# Patient Record
Sex: MALE | Race: WHITE | Employment: UNEMPLOYED | URBAN - METROPOLITAN AREA
[De-identification: names, ages, dates, MRNs, and addresses within clinical notes are randomized per-mention and may not be internally consistent; named-entity substitution may affect disease eponyms.]

---

## 2019-02-19 ENCOUNTER — HOSPITAL ENCOUNTER (OUTPATIENT)
Dept: GENERAL RADIOLOGY | Facility: HOSPITAL | Age: 49
Discharge: HOME | End: 2019-02-19
Attending: INTERNAL MEDICINE
Payer: COMMERCIAL

## 2019-02-19 ENCOUNTER — OFFICE VISIT (OUTPATIENT)
Dept: RHEUMATOLOGY | Facility: CLINIC | Age: 49
End: 2019-02-19
Payer: COMMERCIAL

## 2019-02-19 VITALS
HEIGHT: 68 IN | WEIGHT: 179 LBS | HEART RATE: 64 BPM | DIASTOLIC BLOOD PRESSURE: 78 MMHG | BODY MASS INDEX: 27.13 KG/M2 | SYSTOLIC BLOOD PRESSURE: 118 MMHG

## 2019-02-19 DIAGNOSIS — M13.0 POLYARTHRITIS: ICD-10-CM

## 2019-02-19 DIAGNOSIS — Z87.39 HX OF RHEUMATOID ARTHRITIS: ICD-10-CM

## 2019-02-19 DIAGNOSIS — Z87.39 HX OF RHEUMATOID ARTHRITIS: Primary | ICD-10-CM

## 2019-02-19 PROCEDURE — 73630 X-RAY EXAM OF FOOT: CPT | Performed by: INTERNAL MEDICINE

## 2019-02-19 PROCEDURE — 99212 OFFICE O/P EST SF 10 MIN: CPT | Performed by: INTERNAL MEDICINE

## 2019-02-19 PROCEDURE — 73130 X-RAY EXAM OF HAND: CPT | Performed by: INTERNAL MEDICINE

## 2019-02-19 PROCEDURE — 99204 OFFICE O/P NEW MOD 45 MIN: CPT | Performed by: INTERNAL MEDICINE

## 2019-02-19 RX ORDER — PREDNISONE 1 MG/1
TABLET ORAL
Qty: 45 TABLET | Refills: 0 | Status: SHIPPED | OUTPATIENT
Start: 2019-02-19 | End: 2019-04-08

## 2019-02-19 NOTE — PROGRESS NOTES
Jere Araujo is a 57year old male who presents for Patient presents with:  Joint Pain  Rheumatoid Arthritis: past dx. about 7 years ago, establish care  Foot Pain  Hand Pain  Knee Pain  .    HPI:     I had the pleasure of seeing Jere Araujo on 2/1 pain, no jaw pain, no temple pain  Eyes: No visual changes,   CVS: No chest pain, no heart disease  RS: No SOB, no Cough, No Pleurtic pain,   GI: No nausea, no vomiiting, no abominal pain, no hx of ulcer, no gastritis, no heartburn, no dyshpagia, no BRBPR 2 weeks, then 5mg a day   4. Return to clinic in 2 weeks.    5. Will get records from dr. Prieto Strickland MD  2/19/2019  1:43 PM

## 2019-02-19 NOTE — PATIENT INSTRUCTIONS
1. Check labs  2. Check xrays   3. Start prednisone 10mg x 2 weeks, then 5mg a day   4. Return to clinic in 2 weeks.    5. Will get records from dr. Melara Locus

## 2019-02-22 LAB
ALBUMIN/GLOBULIN RATIO: 1.4 (CALC) (ref 1–2.5)
ALBUMIN: 4.1 G/DL (ref 3.6–5.1)
ALKALINE PHOSPHATASE: 121 U/L (ref 40–115)
ALT: 60 U/L (ref 9–46)
ANA SCREEN, IFA: NEGATIVE
AST: 38 U/L (ref 10–40)
BILIRUBIN, TOTAL: 0.6 MG/DL (ref 0.2–1.2)
BUN: 13 MG/DL (ref 7–25)
C-REACTIVE PROTEIN: 7.3 MG/L
CALCIUM: 9.2 MG/DL (ref 8.6–10.3)
CARBON DIOXIDE: 25 MMOL/L (ref 20–32)
CHLORIDE: 105 MMOL/L (ref 98–110)
CREATINE KINASE, TOTAL: 72 U/L (ref 44–196)
CREATININE: 1.01 MG/DL (ref 0.6–1.35)
CYCLIC CITRULLINATED$PEPTIDE (CCP) AB (IGG): >250 UNITS
EGFR IF AFRICN AM: 103 ML/MIN/1.73M2
EGFR IF NONAFRICN AM: 89 ML/MIN/1.73M2
GLOBULIN: 2.9 G/DL (CALC) (ref 1.9–3.7)
GLUCOSE: 127 MG/DL (ref 65–99)
HEMATOCRIT: 42 % (ref 38.5–50)
HEMOGLOBIN: 14.9 G/DL (ref 13.2–17.1)
MCH: 31.8 PG (ref 27–33)
MCHC: 35.5 G/DL (ref 32–36)
MCV: 89.7 FL (ref 80–100)
MITOGEN-NIL: >10 IU/ML
MPV: 10 FL (ref 7.5–12.5)
NIL: 0.02 IU/ML
PLATELET COUNT: 251 THOUSAND/UL (ref 140–400)
POTASSIUM: 4.3 MMOL/L (ref 3.5–5.3)
PROTEIN, TOTAL: 7 G/DL (ref 6.1–8.1)
QUANTIFERON(R)-TB GOLD PLUS, 1 TUBE: NEGATIVE
RDW: 12.3 % (ref 11–15)
RED BLOOD CELL COUNT: 4.68 MILLION/UL (ref 4.2–5.8)
RHEUMATOID FACTOR: 189 IU/ML
SED RATE BY MODIFIED$WESTERGREN: 9 MM/H
SIGNAL TO CUT-OFF: 0.05
SODIUM: 137 MMOL/L (ref 135–146)
TB1-NIL: 0 IU/ML
TB2-NIL: 0 IU/ML
WHITE BLOOD CELL COUNT: 8.3 THOUSAND/UL (ref 3.8–10.8)

## 2019-02-25 ENCOUNTER — TELEPHONE (OUTPATIENT)
Dept: RHEUMATOLOGY | Facility: CLINIC | Age: 49
End: 2019-02-25

## 2019-03-05 ENCOUNTER — OFFICE VISIT (OUTPATIENT)
Dept: RHEUMATOLOGY | Facility: CLINIC | Age: 49
End: 2019-03-05
Payer: COMMERCIAL

## 2019-03-05 VITALS
DIASTOLIC BLOOD PRESSURE: 74 MMHG | SYSTOLIC BLOOD PRESSURE: 127 MMHG | WEIGHT: 179 LBS | BODY MASS INDEX: 28.77 KG/M2 | HEART RATE: 68 BPM | HEIGHT: 66 IN

## 2019-03-05 DIAGNOSIS — Z51.81 THERAPEUTIC DRUG MONITORING: ICD-10-CM

## 2019-03-05 DIAGNOSIS — M05.79 RHEUMATOID ARTHRITIS INVOLVING MULTIPLE SITES WITH POSITIVE RHEUMATOID FACTOR (HCC): Primary | ICD-10-CM

## 2019-03-05 PROCEDURE — 99214 OFFICE O/P EST MOD 30 MIN: CPT | Performed by: INTERNAL MEDICINE

## 2019-03-05 PROCEDURE — 99212 OFFICE O/P EST SF 10 MIN: CPT | Performed by: INTERNAL MEDICINE

## 2019-03-05 RX ORDER — HYDROXYCHLOROQUINE SULFATE 200 MG/1
200 TABLET, FILM COATED ORAL 2 TIMES DAILY
Qty: 60 TABLET | Refills: 1 | Status: SHIPPED | OUTPATIENT
Start: 2019-03-05 | End: 2019-04-01

## 2019-03-05 NOTE — PROGRESS NOTES
Jessica Lima is a 57year old male who presents for Patient presents with: Follow - Up: labs & x-rays. Pt states prednisone is helping, but not so much. .   HPI:     I had the pleasure of seeing Jessica Lima on 2/19/2019 for evaluation.  His dad fever, no change in weight or appetitie  Derm: No rashes, no oral ulcers, no alopecia, no photosensitivity, no psoriasis  HEENT: No dry eyes, no dry mouth, no Raynaud's, no nasal ulcers, no parotid swelling, no neck pain, no jaw pain, no temple pain  Eyes: CHLORIDE, SERUM      98 - 110 mmol/L 105   CARBON DIOXIDE      20 - 32 mmol/L 25   CALCIUM      8.6 - 10.3 mg/dL 9.2   PROTEIN, TOTAL      6.1 - 8.1 g/dL 7.0   Albumin      3.6 - 5.1 g/dL 4.1   Globulin, Total      1.9 - 3.7 g/dL (calc) 2.9   ALBUMIN/KYUNG xray   CONCLUSION: Normal examination    ASSESSMENT AND PLAN:   Concha Gray is a 57year old male who presents for Patient presents with: Follow - Up: labs & x-rays. Pt states prednisone is helping, but not so much.        1. Seropositive rheumatoid a

## 2019-03-05 NOTE — PATIENT INSTRUCTIONS
1. Finish taking prednisone 5mg a day   2. Start hydroxychlrouqine 200mg twice a day-   3. Return to clinic in 2 months. Hydroxychloroquine tablets  Brand Names: Plaquenil, Quineprox  What is this medicine?   HYDROXYCHLOROQUINE (flavia drox ee Kera Cherry oh abnerin) weak or tired; yellowing of the eyes or skin  · signs and symptoms of low blood sugar such as feeling anxious; confusion; dizziness; increased hunger; unusually weak or tired; sweating; shakiness; cold; irritable; headache; blurred vision; fast heartbeat; irregular heartbeat  · if you often drink alcohol  · kidney disease  · liver disease  · porphyria  · psoriasis  · seizures  · an unusual or allergic reaction to chloroquine, hydroxychloroquine, other medicines, foods, dyes, or preservatives  · pregnant or

## 2019-04-01 RX ORDER — HYDROXYCHLOROQUINE SULFATE 200 MG/1
200 TABLET, FILM COATED ORAL 2 TIMES DAILY
Qty: 180 TABLET | Refills: 0 | Status: SHIPPED | OUTPATIENT
Start: 2019-04-01 | End: 2019-05-06

## 2019-04-01 NOTE — TELEPHONE ENCOUNTER
LOV:3-5  Last Filled:3-5, #60 with 1 refill   Labs:   Future Appointments   Date Time Provider Gil Dumont   5/8/2019 10:20 AM Roxy Mooney MD 2014 [facility]       Please Advise

## 2019-04-04 ENCOUNTER — TELEPHONE (OUTPATIENT)
Dept: RHEUMATOLOGY | Facility: CLINIC | Age: 49
End: 2019-04-04

## 2019-04-04 NOTE — TELEPHONE ENCOUNTER
Pt reports no improvement in symptoms since starting HCQ on 3/5. Pt reports he continues to experience morning stiffness and pain to hands and knees. Pt was advised it may take up to 6 months for full benefits of HCQ, pt verbalized understanding. Pt states he completed prednisone 5mg last week - asking if he should continue low-dose prednisone until f/u appt or other options due to symptoms? Please advise.

## 2019-04-04 NOTE — TELEPHONE ENCOUNTER
Pt stts he has questions regarding medication, asking for a call back         Hydroxychloroquine Sulfate 200 MG Oral Tab Take 1 tablet (200 mg total) by mouth 2 (two) times daily.  Disp: 180 tablet Rfl: 0

## 2019-04-08 RX ORDER — PREDNISONE 5 MG/1
5 TABLET ORAL DAILY
Qty: 30 TABLET | Refills: 1 | Status: SHIPPED | OUTPATIENT
Start: 2019-04-08 | End: 2019-08-07 | Stop reason: ALTCHOICE

## 2019-04-09 NOTE — TELEPHONE ENCOUNTER
Tried calling pt. - left a message - that should be on the hcq for  At least 6 weeks - 8 weeks ideally -   if not better can send in low dose prednisone   - as d/w him in clinic the other option is biologic - which we can discuss in clinic on upcoming appt.

## 2019-05-06 ENCOUNTER — TELEPHONE (OUTPATIENT)
Dept: RHEUMATOLOGY | Facility: CLINIC | Age: 49
End: 2019-05-06

## 2019-05-06 RX ORDER — HYDROXYCHLOROQUINE SULFATE 200 MG/1
200 TABLET, FILM COATED ORAL 2 TIMES DAILY
Qty: 60 TABLET | Refills: 0 | Status: SHIPPED | OUTPATIENT
Start: 2019-05-06 | End: 2019-05-30

## 2019-05-06 NOTE — TELEPHONE ENCOUNTER
90 day supply was sent to [facility] on 4/1/19. Pt requesting 30 day supply to [facility]. He would like to confirm medication is working for him before committing to a 90 days supply. Script sent to local as requested for 30 day supply.   Follow up appt scheduled for

## 2019-05-06 NOTE — TELEPHONE ENCOUNTER
Pt req a refill for meds below and states out of meds and req refill to be filled at [facility] on file    •  Hydroxychloroquine Sulfate 200 MG Oral Tab, Take 1 tablet (200 mg total) by mouth 2 (two) times daily. , Disp: 180 tablet, Rfl: 0

## 2019-05-08 ENCOUNTER — OFFICE VISIT (OUTPATIENT)
Dept: RHEUMATOLOGY | Facility: CLINIC | Age: 49
End: 2019-05-08
Payer: COMMERCIAL

## 2019-05-08 VITALS
DIASTOLIC BLOOD PRESSURE: 79 MMHG | HEIGHT: 66 IN | BODY MASS INDEX: 28.93 KG/M2 | SYSTOLIC BLOOD PRESSURE: 116 MMHG | HEART RATE: 57 BPM | WEIGHT: 180 LBS

## 2019-05-08 DIAGNOSIS — Z51.81 THERAPEUTIC DRUG MONITORING: ICD-10-CM

## 2019-05-08 DIAGNOSIS — M05.79 RHEUMATOID ARTHRITIS INVOLVING MULTIPLE SITES WITH POSITIVE RHEUMATOID FACTOR (HCC): Primary | ICD-10-CM

## 2019-05-08 PROCEDURE — 99213 OFFICE O/P EST LOW 20 MIN: CPT | Performed by: INTERNAL MEDICINE

## 2019-05-08 PROCEDURE — 99212 OFFICE O/P EST SF 10 MIN: CPT | Performed by: INTERNAL MEDICINE

## 2019-05-08 RX ORDER — FEXOFENADINE HCL 180 MG/1
TABLET ORAL AS NEEDED
COMMUNITY
Start: 2005-04-05

## 2019-05-08 RX ORDER — OLOPATADINE HYDROCHLORIDE 1 MG/ML
SOLUTION/ DROPS OPHTHALMIC 2 TIMES DAILY PRN
COMMUNITY
Start: 2005-04-05 | End: 2019-08-07 | Stop reason: ALTCHOICE

## 2019-05-08 NOTE — PROGRESS NOTES
Shanti Guerra is a 57year old male who presents for Patient presents with:  Rheumatoid Arthritis  Hand Pain  Knee Pain  Foot Pain  . HPI:     I had the pleasure of seeing Shanti Guerra on 2/19/2019 for evaluation.  His dad used to work in [employer] Hydroxychloroquine Sulfate 200 MG Oral Tab Take 1 tablet (200 mg total) by mouth 2 (two) times daily. Disp: 60 tablet Rfl: 0   predniSONE 5 MG Oral Tab Take 1 tablet (5 mg total) by mouth daily.  Take 10mg a day x 2 weeks, then 5mg a day Disp: 30 tablet R left 2nda nd 3rd mcps -   Not Tender in left 1-5th pips of toes  left wrist not tender   Mild b/l  knee tendnress at thsi time   No mtp tendnerses at this time  B/l heel tender   No achhilles tendnress  No si joint tenderness.    No edema    Component HBSAg Screen      NON-REACTIVE NON-REACTIVE   CREATINE KINASE, TOTAL      44 - 769 U/L 72   CYCLIC CITRULLINATED$PEPTIDE (CCP) AB (IGG)      UNITS >250 (H)   RHEUMATOID FACTOR      <14 IU/mL 189 (H)     2/19/2019 - right foot xray   CONCLUSION:  1.  Minim

## 2019-05-08 NOTE — PATIENT INSTRUCTIONS
1. Cont. hydroxychlrouqine 200mg twice a day- call when to send in [facility] -   2. . Return to clinic in 3 months.

## 2019-05-30 RX ORDER — HYDROXYCHLOROQUINE SULFATE 200 MG/1
200 TABLET, FILM COATED ORAL 2 TIMES DAILY
Qty: 180 TABLET | Refills: 1 | Status: SHIPPED | OUTPATIENT
Start: 2019-05-30 | End: 2019-08-07

## 2019-05-30 NOTE — TELEPHONE ENCOUNTER
LOV:5-8  Last Filled:5-6, #60 with 0 refill Per LOV notes call when to send into [facility]. Labs:   Future Appointments   Date Time Provider Gil Dumont   8/7/2019 11:40 AM Aury James MD 2014 [facility]       Please Advise

## 2019-08-07 ENCOUNTER — OFFICE VISIT (OUTPATIENT)
Dept: RHEUMATOLOGY | Facility: CLINIC | Age: 49
End: 2019-08-07
Payer: COMMERCIAL

## 2019-08-07 VITALS
DIASTOLIC BLOOD PRESSURE: 75 MMHG | HEART RATE: 68 BPM | BODY MASS INDEX: 27.58 KG/M2 | HEIGHT: 68 IN | SYSTOLIC BLOOD PRESSURE: 118 MMHG | WEIGHT: 182 LBS

## 2019-08-07 DIAGNOSIS — M05.79 RHEUMATOID ARTHRITIS INVOLVING MULTIPLE SITES WITH POSITIVE RHEUMATOID FACTOR (HCC): Primary | ICD-10-CM

## 2019-08-07 DIAGNOSIS — Z79.899 LONG-TERM USE OF PLAQUENIL: ICD-10-CM

## 2019-08-07 DIAGNOSIS — Z51.81 THERAPEUTIC DRUG MONITORING: ICD-10-CM

## 2019-08-07 PROCEDURE — 99214 OFFICE O/P EST MOD 30 MIN: CPT | Performed by: INTERNAL MEDICINE

## 2019-08-07 PROCEDURE — 99212 OFFICE O/P EST SF 10 MIN: CPT | Performed by: INTERNAL MEDICINE

## 2019-08-07 RX ORDER — HYDROXYCHLOROQUINE SULFATE 200 MG/1
200 TABLET, FILM COATED ORAL 2 TIMES DAILY
Qty: 180 TABLET | Refills: 1 | Status: SHIPPED | OUTPATIENT
Start: 2019-08-07 | End: 2020-02-05

## 2019-08-07 NOTE — PATIENT INSTRUCTIONS
1. Cont. hydroxychlrouqine 200mg twice a day-  2. . Return to clinic in 6 months. Ophthalmologist - optometrist - OCT scan   3. Check back in 6 months.

## 2019-08-07 NOTE — PROGRESS NOTES
Moni Houston is a 54year old male who presents for Patient presents with:  Rheumatoid Arthritis: follow up ,patient staes his medication is working well ,denies pain today   .    HPI:     I had the pleasure of seeing Moni Houston on 2/19/2019 for e kg)    Body mass index is 27.67 kg/m². Current Outpatient Medications:  Hydroxychloroquine Sulfate 200 MG Oral Tab Take 1 tablet (200 mg total) by mouth 2 (two) times daily.  Disp: 180 tablet Rfl: 1   Fexofenadine HCl 180 MG Oral Tab Take by mouth as pips of toes  left wrist not tender   Mild b/l  knee tendnress at thsi time   No mtp tendnerses at this time  B/l heel tender   No achhilles tendnress  No si joint tenderness.    No edema    Component      Latest Ref Rng & Units 2/20/2019   GLUCOSE      65 KINASE, TOTAL      44 - 968 U/L 72   CYCLIC CITRULLINATED$PEPTIDE (CCP) AB (IGG)      UNITS >250 (H)   RHEUMATOID FACTOR      <14 IU/mL 189 (H)     2/19/2019 - right foot xray   CONCLUSION:  1.  Minimal osteoarthritis arthritis first metatarsophalangeal felice

## 2019-08-12 ENCOUNTER — TELEPHONE (OUTPATIENT)
Dept: RHEUMATOLOGY | Facility: CLINIC | Age: 49
End: 2019-08-12

## 2019-08-12 NOTE — TELEPHONE ENCOUNTER
Per pt, he did his blood work and 3 of the test needs PA and he did it already last 02,2019 and now it needs an appeal and needs a support that pt really need that blood work to send to [facility]. Any question can call the [facility] per pt or call pt.

## 2019-08-12 NOTE — TELEPHONE ENCOUNTER
Pt reports hepatitis labs were not covered by pt's insurance and needs information sent to insurance to have labs covered. Labs were completed in 2/19.

## 2019-09-23 NOTE — TELEPHONE ENCOUNTER
Patient states that he is still waiting for a prior authorization for his blood work. Per patient he did speak with Dr. Katie Angeles nurse and is still waiting for a phone call. Please, call patient at [phone].

## 2019-09-24 NOTE — TELEPHONE ENCOUNTER
Cindy Nunez  80. department was going to reach out to pt per Rito Mackenzie ([phone]) to assist patient with this matter. Was he contacted?

## 2019-10-28 ENCOUNTER — TELEPHONE (OUTPATIENT)
Dept: RHEUMATOLOGY | Facility: CLINIC | Age: 49
End: 2019-10-28

## 2019-10-28 NOTE — TELEPHONE ENCOUNTER
Patient stated he needs a prior authorizations since February of this year. Please give him a call back.

## 2019-10-29 NOTE — TELEPHONE ENCOUNTER
Khanh Vazquez from [facility] will contact [facility] to see what is needed to process the claim for the patient.

## 2019-10-31 ENCOUNTER — TELEPHONE (OUTPATIENT)
Dept: RHEUMATOLOGY | Facility: CLINIC | Age: 49
End: 2019-10-31

## 2019-10-31 NOTE — TELEPHONE ENCOUNTER
Letter of medical necessity was generated for pt's denied labs. Letter and reference will be given to Kenn Rothman in the PA department to sent to insurance. Kenn Rothman aware letter completed.

## 2019-11-01 NOTE — TELEPHONE ENCOUNTER
Left message for pt; a letter of medical necessity was sent into his insurance today for the labs not covered in February. It can take 30 days to get a decision from his insurance; Moises will check insurance periodically for an update.  Call office with a

## 2020-02-05 ENCOUNTER — OFFICE VISIT (OUTPATIENT)
Dept: RHEUMATOLOGY | Facility: CLINIC | Age: 50
End: 2020-02-05
Payer: COMMERCIAL

## 2020-02-05 VITALS
RESPIRATION RATE: 16 BRPM | HEIGHT: 68 IN | BODY MASS INDEX: 28.34 KG/M2 | WEIGHT: 187 LBS | SYSTOLIC BLOOD PRESSURE: 109 MMHG | HEART RATE: 66 BPM | DIASTOLIC BLOOD PRESSURE: 75 MMHG

## 2020-02-05 DIAGNOSIS — Z79.899 LONG-TERM USE OF PLAQUENIL: ICD-10-CM

## 2020-02-05 DIAGNOSIS — M05.79 RHEUMATOID ARTHRITIS INVOLVING MULTIPLE SITES WITH POSITIVE RHEUMATOID FACTOR (HCC): Primary | ICD-10-CM

## 2020-02-05 DIAGNOSIS — Z51.81 THERAPEUTIC DRUG MONITORING: ICD-10-CM

## 2020-02-05 PROCEDURE — 99214 OFFICE O/P EST MOD 30 MIN: CPT | Performed by: INTERNAL MEDICINE

## 2020-02-05 PROCEDURE — 99212 OFFICE O/P EST SF 10 MIN: CPT | Performed by: INTERNAL MEDICINE

## 2020-02-05 RX ORDER — HYDROXYCHLOROQUINE SULFATE 200 MG/1
200 TABLET, FILM COATED ORAL 2 TIMES DAILY
Qty: 180 TABLET | Refills: 1 | Status: SHIPPED | OUTPATIENT
Start: 2020-02-05 | End: 2020-08-04

## 2020-02-05 NOTE — PROGRESS NOTES
Gabbie Lorenzo is a 54year old male who presents for Patient presents with:  Rheumatoid Arthritis  Medication Follow-Up  Hand Pain  . HPI:     I had the pleasure of seeing Gabbie Lorenzo on 2/19/2019 for evaluation.  His dad used to work in [employer] He wants to continue this. Wt Readings from Last 2 Encounters:  02/05/20 : 187 lb (84.8 kg)  08/07/19 : 182 lb (82.6 kg)    Body mass index is 28.43 kg/m².       Current Outpatient Medications   Medication Sig Dispense Refill   • Hydroxychloroquin RRR, no murmurs  RS: CTAB, no crackles, no rhonchi  ABD: Soft Non tender, no HSM felt, BS positive  Joint exam:   Not  Tender in 1-5th pips - fingers -   Very mild Swelling in left 2nda nd 3rd mcps -   Not Tender in left 1-5th pips of toes  left wrist not NEGATIVE NEGATIVE   C-REACTIVE PROTEIN      <8.0 mg/L 7.3   SED RATE BY MODIFIED$WESTERGREN      < OR = 15 mm/h 9   HEPATITIS B CORE AB TOTAL      NON-REACTIVE NON-REACTIVE   HBSAg Screen      NON-REACTIVE NON-REACTIVE   CREATINE KINASE, TOTAL      44 - 19 months.        - Will get records from dr. Kenn Richmond MD  2/5/2020   11:25 AM  - [place]ed [place]  through Epic

## 2020-02-18 ENCOUNTER — OFFICE VISIT (OUTPATIENT)
Dept: OPHTHALMOLOGY | Facility: CLINIC | Age: 50
End: 2020-02-18
Payer: COMMERCIAL

## 2020-02-18 DIAGNOSIS — H52.13 MYOPIA OF BOTH EYES WITH ASTIGMATISM AND PRESBYOPIA: ICD-10-CM

## 2020-02-18 DIAGNOSIS — Z79.899 LONG-TERM USE OF PLAQUENIL: Primary | ICD-10-CM

## 2020-02-18 DIAGNOSIS — H52.203 MYOPIA OF BOTH EYES WITH ASTIGMATISM AND PRESBYOPIA: ICD-10-CM

## 2020-02-18 DIAGNOSIS — H52.4 MYOPIA OF BOTH EYES WITH ASTIGMATISM AND PRESBYOPIA: ICD-10-CM

## 2020-02-18 PROCEDURE — 92015 DETERMINE REFRACTIVE STATE: CPT | Performed by: OPHTHALMOLOGY

## 2020-02-18 PROCEDURE — 99212 OFFICE O/P EST SF 10 MIN: CPT | Performed by: OPHTHALMOLOGY

## 2020-02-18 PROCEDURE — 99203 OFFICE O/P NEW LOW 30 MIN: CPT | Performed by: OPHTHALMOLOGY

## 2020-02-18 NOTE — PROGRESS NOTES
Nik Bojorquez is a 54year old male. HPI:     HPI     Consult      Additional comments: Per Dr Andrae Ford     NP.   Pt is here for a Plaquenil exam. Pt is taking plaquenil 200 mg PO BID for rheumatoid arthritis (started 1 year Glasses          Tonometry ([facility], 9:02 AM)       Right Left    Pressure 18 19          Pupils       Pupils    Right PERRL    Left PERRL          Visual Fields       Left Right     Full Full          Extraocular Movement       Right Left     Full, Ortho Fu guidelines. Patient is approved to continue on plaquenil as directed by their PCP or rheumatologist.        Myopia of both eyes with astigmatism and presbyopia  New glasses today for distance only or progressive bifocals; patient's choice.        No order

## 2020-04-06 RX ORDER — HYDROXYCHLOROQUINE SULFATE 200 MG/1
TABLET, FILM COATED ORAL
Qty: 180 TABLET | Refills: 1 | OUTPATIENT
Start: 2020-04-06

## 2020-04-06 NOTE — TELEPHONE ENCOUNTER
Last filled: 2/5/2020 #180 tab with 1 refill  LOV: 2/5/2020  Eye exam: 8/18/2020  Future Appointments   Date Time Provider Gil Reyesi   8/5/2020 11:00 AM Bravo Wadsworth MD 2014 [facility]   2/18/2021 10:15 AM Judy Solitario MD [address]

## 2020-08-04 RX ORDER — HYDROXYCHLOROQUINE SULFATE 200 MG/1
TABLET, FILM COATED ORAL
Qty: 180 TABLET | Refills: 3 | Status: SHIPPED | OUTPATIENT
Start: 2020-08-04 | End: 2021-12-31

## 2020-08-04 NOTE — TELEPHONE ENCOUNTER
LOV:2/5/20   Last Refilled:#180, 1rf  2/5/20  Last eye exam:2/18/20    Future Appointments   Date Time Provider Gil Reyesi   8/5/2020 11:00 AM Aly Gutierrez MD 2014 [facility]   2/18/2021 10:15 AM Alvin Beatty MD [address] [facility]       Please advise.

## 2020-08-05 ENCOUNTER — OFFICE VISIT (OUTPATIENT)
Dept: RHEUMATOLOGY | Facility: CLINIC | Age: 50
End: 2020-08-05
Payer: COMMERCIAL

## 2020-08-05 VITALS
HEIGHT: 68 IN | RESPIRATION RATE: 16 BRPM | DIASTOLIC BLOOD PRESSURE: 75 MMHG | WEIGHT: 180.38 LBS | BODY MASS INDEX: 27.34 KG/M2 | SYSTOLIC BLOOD PRESSURE: 116 MMHG | HEART RATE: 80 BPM

## 2020-08-05 DIAGNOSIS — Z51.81 THERAPEUTIC DRUG MONITORING: ICD-10-CM

## 2020-08-05 DIAGNOSIS — M05.79 RHEUMATOID ARTHRITIS INVOLVING MULTIPLE SITES WITH POSITIVE RHEUMATOID FACTOR (HCC): Primary | ICD-10-CM

## 2020-08-05 PROCEDURE — 3074F SYST BP LT 130 MM HG: CPT | Performed by: INTERNAL MEDICINE

## 2020-08-05 PROCEDURE — 99212 OFFICE O/P EST SF 10 MIN: CPT | Performed by: INTERNAL MEDICINE

## 2020-08-05 PROCEDURE — 3078F DIAST BP <80 MM HG: CPT | Performed by: INTERNAL MEDICINE

## 2020-08-05 PROCEDURE — 99214 OFFICE O/P EST MOD 30 MIN: CPT | Performed by: INTERNAL MEDICINE

## 2020-08-05 PROCEDURE — 3008F BODY MASS INDEX DOCD: CPT | Performed by: INTERNAL MEDICINE

## 2020-08-05 NOTE — PROGRESS NOTES
Virginia Rodriguez is a 52year old male who presents for Patient presents with:  Rheumatoid Arthritis  Medication Follow-Up  . HPI:     I had the pleasure of seeing Virginia Rodriguez on 2/19/2019 for evaluation.  His dad used to work in [employer] a to continue this. 8/5/2020  He has 0-1/10. He is taking hcq 200mg a day. For 4-5 months. He is not noticing any flares. Able to to go everything. No increased stiffness in the morning. He painted his room for the last 4-5 days.  He's achy from diana Location: Left arm, Patient Position: Sitting, Cuff Size: adult)   Pulse 80   Resp 16   Ht 5' 8\" (1.727 m)   Wt 180 lb 6.4 oz (81.8 kg)   BMI 27.43 kg/m²   HEENT: Clear oropharynx, no oral ulcers, EOM intact, clear sclear, PERRLA, pleasant, no acute distr 15.0 % 12.3   Platelet Count      768 - 400 Thousand/uL 251   MPV      7.5 - 12.5 fL 10.0   QUANTIFERON(R)-TB GOLD PLUS, 1 TUBE      NEGATIVE NEGATIVE   NIL      IU/mL 0.02   MITOGEN-NIL      IU/mL >10.00   TB1-NIL      IU/mL 0.00   TB2-NIL      IU/mL 0.00 weeks. But not since 4/2019 -   He drinkse 3-4 beers nightly for the last several years   - not a candidate for mtx or leflunomide b/c of slighlty increased lfts   - hx of n/v with ssz     If this doesn't work he will try humira again.    - rtc in 6 months

## 2020-08-05 NOTE — PATIENT INSTRUCTIONS
1. Cont. hydroxychlrouqine 200mg twice a day- if need to can go back to twice a day if needed. 2.. Return to clinic in 12 months. 3. Check back in 12 months.

## 2021-02-18 ENCOUNTER — OFFICE VISIT (OUTPATIENT)
Dept: OPHTHALMOLOGY | Facility: CLINIC | Age: 51
End: 2021-02-18
Payer: COMMERCIAL

## 2021-02-18 DIAGNOSIS — Z79.899 LONG-TERM USE OF PLAQUENIL: Primary | ICD-10-CM

## 2021-02-18 DIAGNOSIS — H52.13 MYOPIA OF BOTH EYES WITH ASTIGMATISM AND PRESBYOPIA: ICD-10-CM

## 2021-02-18 DIAGNOSIS — H52.4 MYOPIA OF BOTH EYES WITH ASTIGMATISM AND PRESBYOPIA: ICD-10-CM

## 2021-02-18 DIAGNOSIS — H52.203 MYOPIA OF BOTH EYES WITH ASTIGMATISM AND PRESBYOPIA: ICD-10-CM

## 2021-02-18 PROCEDURE — 92014 COMPRE OPH EXAM EST PT 1/>: CPT | Performed by: OPHTHALMOLOGY

## 2021-02-18 PROCEDURE — 92015 DETERMINE REFRACTIVE STATE: CPT | Performed by: OPHTHALMOLOGY

## 2021-02-18 NOTE — PROGRESS NOTES
Shanti Guerra is a 52year old male. HPI:     HPI     Pt in today for a plaquenil eye exam. Per pt is taking plaquenil 200 mg once a day for rheumatoid arthritis. Pt states vision is stable and denies any ocular issues.      Last edited by Vicky Ivey Fundus Exam       Right Left    Disc Sloping margin, Temporal crescent Sloping margin, Temporal crescent    C/D Ratio 0.4 0.4    Macula Normal- no plaquenil toxicity  Normal- no plaquenil toxicity     Vessels Normal Normal    Periphery Normal Norm

## 2021-02-18 NOTE — PATIENT INSTRUCTIONS
Long-term use of Plaquenil   No evidence of plaquenil toxicity in either eye. Will follow in 1 year for a plaquenil eye exam based on current guidelines.    Patient is approved to continue on plaquenil as directed by their PCP or rheumatologist.        Miguel

## 2021-05-26 ENCOUNTER — TELEPHONE (OUTPATIENT)
Dept: RHEUMATOLOGY | Facility: CLINIC | Age: 51
End: 2021-05-26

## 2021-05-26 RX ORDER — METHYLPREDNISOLONE 4 MG/1
TABLET ORAL
Qty: 1 EACH | Refills: 0 | Status: SHIPPED | OUTPATIENT
Start: 2021-05-26

## 2021-05-26 NOTE — TELEPHONE ENCOUNTER
Patient called to advise he has been having some lower back pain that seems to be bad in the morning, but lessens (but doesn't fully go away) as the day goes on. Patient was wondering if this could be related to his RA that he was already diagnosed with?  I

## 2021-05-26 NOTE — TELEPHONE ENCOUNTER
Dr. Allie Tierney, patient reports back pain starting Sunday. Pain is worse in the morning, better at night. Denies injury to back. Wondering if this is arthritis related or if he should see back doctor/chiropractor? Using ibuprofen which is helping some.    Has [employer] kj

## 2021-05-26 NOTE — TELEPHONE ENCOUNTER
Patient just returned the call. He was notified of Dr. Jossue Mooney note and verbalized understanding. Asked about back specialist and informed that if it doesn't get well and schedule F/U and Dr. Lori Jc will write a referral then.

## 2021-05-26 NOTE — TELEPHONE ENCOUNTER
Ok to let pt . Know usually RA - is not associated with back pain as much as the peripheral pain. He can cont. Ibuprofen. I will send in a medrol sharla and see if that helps. Treatment for back pain is nsaids, ice, heat, and after 2 days, make sure to cont.

## 2021-08-02 ENCOUNTER — OFFICE VISIT (OUTPATIENT)
Dept: RHEUMATOLOGY | Facility: CLINIC | Age: 51
End: 2021-08-02
Payer: COMMERCIAL

## 2021-08-02 VITALS
RESPIRATION RATE: 16 BRPM | SYSTOLIC BLOOD PRESSURE: 120 MMHG | WEIGHT: 187 LBS | HEIGHT: 68 IN | DIASTOLIC BLOOD PRESSURE: 81 MMHG | HEART RATE: 79 BPM | BODY MASS INDEX: 28.34 KG/M2

## 2021-08-02 DIAGNOSIS — Z13.220 SCREENING CHOLESTEROL LEVEL: ICD-10-CM

## 2021-08-02 DIAGNOSIS — Z13.1 DIABETES MELLITUS SCREENING: ICD-10-CM

## 2021-08-02 DIAGNOSIS — M05.79 RHEUMATOID ARTHRITIS INVOLVING MULTIPLE SITES WITH POSITIVE RHEUMATOID FACTOR (HCC): Primary | ICD-10-CM

## 2021-08-02 DIAGNOSIS — E55.9 VITAMIN D DEFICIENCY: ICD-10-CM

## 2021-08-02 DIAGNOSIS — Z51.81 THERAPEUTIC DRUG MONITORING: ICD-10-CM

## 2021-08-02 PROCEDURE — 3008F BODY MASS INDEX DOCD: CPT | Performed by: INTERNAL MEDICINE

## 2021-08-02 PROCEDURE — 3079F DIAST BP 80-89 MM HG: CPT | Performed by: INTERNAL MEDICINE

## 2021-08-02 PROCEDURE — 99214 OFFICE O/P EST MOD 30 MIN: CPT | Performed by: INTERNAL MEDICINE

## 2021-08-02 PROCEDURE — 3074F SYST BP LT 130 MM HG: CPT | Performed by: INTERNAL MEDICINE

## 2021-08-02 NOTE — PROGRESS NOTES
Araceli Durán is a 54year old male who presents for Patient presents with:  Rheumatoid Arthritis  Medication Follow-Up  . HPI:     I had the pleasure of seeing Araceli Durán on 2/19/2019 for evaluation.  His dad used to work in [employer] [facility] [employer] [facility] a to continue this. 8/5/2020  He has 0-1/10. He is taking hcq 200mg a day. For 4-5 months. He is not noticing any flares. Able to to go everything. No increased stiffness in the morning. He painted his room for the last 4-5 days.  He's achy from diana no abominal pain, no hx of ulcer, no gastritis, no heartburn, no dyshpagia, no BRBPR or melena  : no dysuria, no hx of  Kidney stones -   Neuro: No numbness or tingling, no headache, no hx of seizures,   Psych: no hx of anxiety or depression  ENDO: no hx WHITE BLOOD CELL COUNT      3.8 - 10.8 Thousand/uL 8.3   RED BLOOD CELL COUNT      4.20 - 5.80 Million/uL 4.68   Hemoglobin      13.2 - 17.1 g/dL 14.9   Hematocrit      38.5 - 50.0 % 42.0   MCV      80.0 - 100.0 fL 89.7   MCH      27.0 - 33.0 pg 31.8   M on bid dosing in case pain increase   - f/u in 1 year -   - check labs now  - include lipid panel and hba1c     In the past:   Was on humira for 1 year in the past [id number]   Was off for several years - now having more pain   Intermittently has taken -  p

## 2021-08-02 NOTE — PATIENT INSTRUCTIONS
1. Cont. hydroxychlrouqine 200mg  One daily  For now  if need to can go back to twice a day if needed. 2.. Return to clinic in 12 months.    3. Check labs

## 2021-08-13 LAB
ALBUMIN/GLOBULIN RATIO: 1.7 (CALC) (ref 1–2.5)
ALBUMIN: 4.3 G/DL (ref 3.6–5.1)
ALKALINE PHOSPHATASE: 77 U/L (ref 36–130)
ALT: 38 U/L (ref 9–46)
AST: 25 U/L (ref 10–40)
BILIRUBIN, TOTAL: 0.7 MG/DL (ref 0.2–1.2)
BUN: 11 MG/DL (ref 7–25)
C-REACTIVE PROTEIN: 2.2 MG/L
CALCIUM: 9.2 MG/DL (ref 8.6–10.3)
CARBON DIOXIDE: 26 MMOL/L (ref 20–32)
CHLORIDE: 105 MMOL/L (ref 98–110)
CHOL/HDLC RATIO: 5.2 (CALC)
CHOLESTEROL, TOTAL: 206 MG/DL
CREATININE: 0.91 MG/DL (ref 0.6–1.35)
EGFR IF AFRICN AM: 114 ML/MIN/1.73M2
EGFR IF NONAFRICN AM: 99 ML/MIN/1.73M2
GLOBULIN: 2.6 G/DL (CALC) (ref 1.9–3.7)
GLUCOSE: 95 MG/DL (ref 65–99)
HDL CHOLESTEROL: 40 MG/DL
HEMATOCRIT: 43.9 % (ref 38.5–50)
HEMOGLOBIN A1C: 5.2 % OF TOTAL HGB
HEMOGLOBIN: 15.3 G/DL (ref 13.2–17.1)
LDL-CHOLESTEROL: 131 MG/DL (CALC)
MCH: 31.7 PG (ref 27–33)
MCHC: 34.9 G/DL (ref 32–36)
MCV: 90.9 FL (ref 80–100)
MPV: 10 FL (ref 7.5–12.5)
NON-HDL CHOLESTEROL: 166 MG/DL (CALC)
PLATELET COUNT: 237 THOUSAND/UL (ref 140–400)
POTASSIUM: 4.4 MMOL/L (ref 3.5–5.3)
PROTEIN, TOTAL: 6.9 G/DL (ref 6.1–8.1)
RDW: 12.8 % (ref 11–15)
RED BLOOD CELL COUNT: 4.83 MILLION/UL (ref 4.2–5.8)
SED RATE BY MODIFIED$WESTERGREN: 2 MM/H
SODIUM: 137 MMOL/L (ref 135–146)
TRIGLYCERIDES: 209 MG/DL
VITAMIN D, 25-OH, TOTAL: 17 NG/ML (ref 30–100)
WHITE BLOOD CELL COUNT: 8.3 THOUSAND/UL (ref 3.8–10.8)

## 2021-12-31 RX ORDER — HYDROXYCHLOROQUINE SULFATE 200 MG/1
200 TABLET, FILM COATED ORAL 2 TIMES DAILY
Qty: 180 TABLET | Refills: 2 | Status: SHIPPED | OUTPATIENT
Start: 2021-12-31 | End: 2022-08-02

## 2021-12-31 NOTE — TELEPHONE ENCOUNTER
LOV: 8/2/21  Future Appointments   Date Time Provider Gil Dumont   2/22/2022 10:15 AM Francisca Francis MD [address] [facility]   8/2/2022 11:00 AM Agustin Ding MD 2014 [facility]    LABS:  Component      Latest Ref Rng & Units 8/12/2021   Glucose      65 - 99 mg/dL 95   BUN      7 - 25 mg/dL 11   CREATININE      0.60 - 1.35 mg/dL 0.91   eGFR NON-AFR. AMERICAN      > OR = 60 mL/min/1.73m2 99   eGFR AFRICAN AMERICAN      > OR = 60 mL/min/1.73m2 114   BUN/CREATININE RATIO      6 - 22 (calc) NOT APPLICABLE   Sodium      135 - 146 mmol/L 137   Potassium      3.5 - 5.3 mmol/L 4.4   Chloride      98 - 110 mmol/L 105   Carbon Dioxide, Total      20 - 32 mmol/L 26   CALCIUM      8.6 - 10.3 mg/dL 9.2   PROTEIN, TOTAL      6.1 - 8.1 g/dL 6.9   Albumin      3.6 - 5.1 g/dL 4.3   Globulin      1.9 - 3.7 g/dL (calc) 2.6   A/G Ratio      1.0 - 2.5 (calc) 1.7   Total Bilirubin      0.2 - 1.2 mg/dL 0.7   Alkaline Phosphatase      36 - 130 U/L 77   AST (SGOT)      10 - 40 U/L 25   ALT (SGPT)      9 - 46 U/L 38   WBC      3.8 - 10.8 Thousand/uL 8.3   RBC      4.20 - 5.80 Million/uL 4.83   Hemoglobin      13.2 - 17.1 g/dL 15.3   Hematocrit      38.5 - 50.0 % 43.9   MCV      80.0 - 100.0 fL 90.9   MCH      27.0 - 33.0 pg 31.7   MCHC      32.0 - 36.0 g/dL 34.9   RDW      11.0 - 15.0 % 12.8   Platelet Count      256 - 400 Thousand/uL 237   MPV      7.5 - 12.5 fL 10.0   Cholesterol, Total      <200 mg/dL 206 (H)   HDL Cholesterol      > OR = 40 mg/dL 40   Triglycerides      <150 mg/dL 209 (H)   LDL Cholesterol Calc      mg/dL (calc) 131 (H)   Chol/HDL Ratio      <5.0 (calc) 5.2 (H)   NON-HDL CHOLESTEROL      <130 mg/dL (calc) 166 (H)   C-REACTIVE PROTEIN      <8.0 mg/L 2.2   SED RATE BY MODIFIED$WESTERGREN      < OR = 15 mm/h 2   VITAMIN D, 25-OH, TOTAL      30 - 100 ng/mL 17 (L)   HEMOGLOBIN A1c      <5.7 % of total Hgb 5.2     Summary:  1.  Cont. hydroxychlrouqine 200mg  One daily  For now  if need to can go back to twice a day if needed. 2.. Return to clinic in 12 months.    3. Check labs now

## 2022-04-14 ENCOUNTER — OFFICE VISIT (OUTPATIENT)
Dept: OPHTHALMOLOGY | Facility: CLINIC | Age: 52
End: 2022-04-14
Payer: COMMERCIAL

## 2022-04-14 DIAGNOSIS — H52.13 MYOPIA OF BOTH EYES WITH ASTIGMATISM AND PRESBYOPIA: ICD-10-CM

## 2022-04-14 DIAGNOSIS — Z79.899 LONG-TERM USE OF PLAQUENIL: Primary | ICD-10-CM

## 2022-04-14 DIAGNOSIS — H52.203 MYOPIA OF BOTH EYES WITH ASTIGMATISM AND PRESBYOPIA: ICD-10-CM

## 2022-04-14 DIAGNOSIS — H52.4 MYOPIA OF BOTH EYES WITH ASTIGMATISM AND PRESBYOPIA: ICD-10-CM

## 2022-04-14 PROCEDURE — 92014 COMPRE OPH EXAM EST PT 1/>: CPT | Performed by: OPHTHALMOLOGY

## 2022-04-14 NOTE — PATIENT INSTRUCTIONS
Long-term use of Plaquenil   No evidence of plaquenil toxicity in either eye. Will follow in 1 year for a plaquenil eye exam based on current guidelines. Patient is approved to continue on plaquenil as directed by their PCP or rheumatologist.        Myopia of both eyes with astigmatism and presbyopia  Continue same glasses.

## 2022-07-05 ENCOUNTER — OFFICE VISIT (OUTPATIENT)
Dept: GASTROENTEROLOGY | Facility: CLINIC | Age: 52
End: 2022-07-05
Payer: COMMERCIAL

## 2022-07-05 ENCOUNTER — TELEPHONE (OUTPATIENT)
Dept: GASTROENTEROLOGY | Facility: CLINIC | Age: 52
End: 2022-07-05

## 2022-07-05 VITALS
HEART RATE: 69 BPM | TEMPERATURE: 99 F | HEIGHT: 68 IN | DIASTOLIC BLOOD PRESSURE: 75 MMHG | SYSTOLIC BLOOD PRESSURE: 118 MMHG | BODY MASS INDEX: 28.04 KG/M2 | WEIGHT: 185 LBS

## 2022-07-05 DIAGNOSIS — K92.1 HEMATOCHEZIA: ICD-10-CM

## 2022-07-05 DIAGNOSIS — Z12.11 COLON CANCER SCREENING: Primary | ICD-10-CM

## 2022-07-05 DIAGNOSIS — Z12.11 SCREENING FOR COLON CANCER: Primary | ICD-10-CM

## 2022-07-05 PROCEDURE — 3008F BODY MASS INDEX DOCD: CPT | Performed by: NURSE PRACTITIONER

## 2022-07-05 PROCEDURE — 3074F SYST BP LT 130 MM HG: CPT | Performed by: NURSE PRACTITIONER

## 2022-07-05 PROCEDURE — 3078F DIAST BP <80 MM HG: CPT | Performed by: NURSE PRACTITIONER

## 2022-07-05 PROCEDURE — 99203 OFFICE O/P NEW LOW 30 MIN: CPT | Performed by: NURSE PRACTITIONER

## 2022-07-05 RX ORDER — POLYETHYLENE GLYCOL 3350, SODIUM CHLORIDE, SODIUM BICARBONATE, POTASSIUM CHLORIDE 420; 11.2; 5.72; 1.48 G/4L; G/4L; G/4L; G/4L
POWDER, FOR SOLUTION ORAL
Qty: 4000 ML | Refills: 0 | Status: SHIPPED | OUTPATIENT
Start: 2022-07-05

## 2022-07-05 NOTE — PATIENT INSTRUCTIONS
-Schedule colonoscopy w/first available MD w/ IV [place] or MAC  Dx: screening, hematochezia   -Eligible for NE: Yes r/t BMI < 40  -Prep: Split dose Colyte/TriLyte or equivalent  -Anti-platelets and anti-coagulants: none  -Diabetes meds: none    ** If MAC:    - HOLD ACE/ARBs the night before and/or the day of the procedure(s) - N/A   - NO alcohol, recreational drugs nor erectile dysfunction medications 24 hours before procedure(s)   - NO herbal supplements or weight loss medications (phentermine/Vyvanse/Adderall)  x 7 days prior to the procedure(s)    ** If MAC @ [facility] or IV twilight - continue all medications as prescribed    ** COVID-19 testing required 72 hours prior to procedure    **Patient to follow-up with any new medical history/medications prior to procedure**

## 2022-07-05 NOTE — TELEPHONE ENCOUNTER
Scheduled for:  Colonoscopy [address]  Provider Name:  Dr Halima Jean Baptiste  Date:  9/14/2022  Location:  [facility]  Sedation:  MAC  Time:  2:00pm (pt is aware to arrive at 1:00pm)  Prep:  Trilyte  Meds/Allergies Reconciled?:  Melba/DUGLASN reviewed. Diagnosis with codes:  Colon screening Z12.11 Hematochezia K92.1  Was patient informed to call insurance with codes (Y/N):  Yes  Referral sent?:  Referral was sent at the time of electronic surgical scheduling. [address] [facility] or [facility]  notified?:  I sent an electronic request to Endo Scheduling and received a confirmation today. Medication Orders:   This patient verbally confirmed that he is not taking:  Iron, blood thinners, BP meds, and is not diabetic  Not latex allergy, Not PCN allergy and does not have a pacemaker  Pt is aware to NOT take iron pills, herbal meds and diet supplements for 7 days before exam. Also to NOT take any form of alcohol, recreational drugs and any forms of ED meds 24 hours before exam.       Misc Orders:  Patient is aware that the ENDO dept will call to schedule a COVID 19 test before the procedure      Further instructions given by staff:   I discussed the prep instructions with the patient at the time of the appointment which he verbally understood

## 2022-08-02 ENCOUNTER — OFFICE VISIT (OUTPATIENT)
Dept: RHEUMATOLOGY | Facility: CLINIC | Age: 52
End: 2022-08-02
Payer: COMMERCIAL

## 2022-08-02 VITALS
RESPIRATION RATE: 16 BRPM | SYSTOLIC BLOOD PRESSURE: 116 MMHG | HEIGHT: 68 IN | BODY MASS INDEX: 27.57 KG/M2 | HEART RATE: 73 BPM | DIASTOLIC BLOOD PRESSURE: 77 MMHG | WEIGHT: 181.88 LBS

## 2022-08-02 DIAGNOSIS — E55.9 VITAMIN D DEFICIENCY: ICD-10-CM

## 2022-08-02 DIAGNOSIS — M05.79 RHEUMATOID ARTHRITIS INVOLVING MULTIPLE SITES WITH POSITIVE RHEUMATOID FACTOR (HCC): Primary | ICD-10-CM

## 2022-08-02 DIAGNOSIS — Z13.1 DIABETES MELLITUS SCREENING: ICD-10-CM

## 2022-08-02 DIAGNOSIS — Z51.81 THERAPEUTIC DRUG MONITORING: ICD-10-CM

## 2022-08-02 DIAGNOSIS — Z13.220 SCREENING CHOLESTEROL LEVEL: ICD-10-CM

## 2022-08-02 PROCEDURE — 3078F DIAST BP <80 MM HG: CPT | Performed by: INTERNAL MEDICINE

## 2022-08-02 PROCEDURE — 3008F BODY MASS INDEX DOCD: CPT | Performed by: INTERNAL MEDICINE

## 2022-08-02 PROCEDURE — 99214 OFFICE O/P EST MOD 30 MIN: CPT | Performed by: INTERNAL MEDICINE

## 2022-08-02 PROCEDURE — 3074F SYST BP LT 130 MM HG: CPT | Performed by: INTERNAL MEDICINE

## 2022-08-02 RX ORDER — HYDROXYCHLOROQUINE SULFATE 200 MG/1
200 TABLET, FILM COATED ORAL 2 TIMES DAILY
Qty: 180 TABLET | Refills: 3 | Status: SHIPPED | OUTPATIENT
Start: 2022-08-02 | End: 2022-08-02

## 2022-08-02 NOTE — PATIENT INSTRUCTIONS
1. Cont. hydroxychlrouqine 200mg   - increase to twice a day   2.. Return to clinic in 12 months. 3. Check labs now   4.  Eye exam in 2/2023 - due

## 2022-08-03 RX ORDER — HYDROXYCHLOROQUINE SULFATE 200 MG/1
200 TABLET, FILM COATED ORAL 2 TIMES DAILY
Qty: 180 TABLET | Refills: 3 | Status: SHIPPED | OUTPATIENT
Start: 2022-08-03

## 2022-08-09 LAB
ALBUMIN/GLOBULIN RATIO: 1.7 (CALC) (ref 1–2.5)
ALBUMIN: 4.4 G/DL (ref 3.6–5.1)
ALKALINE PHOSPHATASE: 99 U/L (ref 35–144)
ALT: 58 U/L (ref 9–46)
AST: 42 U/L (ref 10–35)
BILIRUBIN, TOTAL: 0.7 MG/DL (ref 0.2–1.2)
BUN: 14 MG/DL (ref 7–25)
C-REACTIVE PROTEIN: 3.4 MG/L
CALCIUM: 9.3 MG/DL (ref 8.6–10.3)
CARBON DIOXIDE: 28 MMOL/L (ref 20–32)
CHLORIDE: 104 MMOL/L (ref 98–110)
CHOL/HDLC RATIO: 4.2 (CALC)
CHOLESTEROL, TOTAL: 211 MG/DL
CREATININE: 1 MG/DL (ref 0.7–1.3)
EGFR: 92 ML/MIN/1.73M2
GLOBULIN: 2.6 G/DL (CALC) (ref 1.9–3.7)
GLUCOSE: 94 MG/DL (ref 65–99)
HDL CHOLESTEROL: 50 MG/DL
HEMATOCRIT: 44.9 % (ref 38.5–50)
HEMOGLOBIN A1C: 5 % OF TOTAL HGB
HEMOGLOBIN: 15.4 G/DL (ref 13.2–17.1)
LDL-CHOLESTEROL: 141 MG/DL (CALC)
MCH: 31.7 PG (ref 27–33)
MCHC: 34.3 G/DL (ref 32–36)
MCV: 92.4 FL (ref 80–100)
MPV: 10.4 FL (ref 7.5–12.5)
NON-HDL CHOLESTEROL: 161 MG/DL (CALC)
PLATELET COUNT: 222 THOUSAND/UL (ref 140–400)
POTASSIUM: 4.5 MMOL/L (ref 3.5–5.3)
PROTEIN, TOTAL: 7 G/DL (ref 6.1–8.1)
RDW: 12.7 % (ref 11–15)
RED BLOOD CELL COUNT: 4.86 MILLION/UL (ref 4.2–5.8)
SED RATE BY MODIFIED$WESTERGREN: 6 MM/H
SODIUM: 139 MMOL/L (ref 135–146)
TRIGLYCERIDES: 94 MG/DL
VITAMIN D, 25-OH, TOTAL: 29 NG/ML (ref 30–100)
WHITE BLOOD CELL COUNT: 7.8 THOUSAND/UL (ref 3.8–10.8)

## 2022-08-13 ENCOUNTER — TELEPHONE (OUTPATIENT)
Dept: RHEUMATOLOGY | Facility: CLINIC | Age: 52
End: 2022-08-13

## 2022-08-13 DIAGNOSIS — R79.89 ELEVATED LIVER FUNCTION TESTS: Primary | ICD-10-CM

## 2022-08-13 RX ORDER — ERGOCALCIFEROL (VITAMIN D2) 1250 MCG
50000 CAPSULE ORAL WEEKLY
Qty: 12 CAPSULE | Refills: 0 | Status: SHIPPED | OUTPATIENT
Start: 2022-08-13 | End: 2022-11-11

## 2022-08-13 NOTE — TELEPHONE ENCOUNTER
Will send in  ergocalciferol 50,000units a week x 12 weeks or vit d 2000units otc   - please ask pt.  To get labs again for his liver in 6 months - order will be placed

## 2022-08-15 NOTE — TELEPHONE ENCOUNTER
Pt contacted and aware script sent for vitamin D to take weekly and then OTC 2,000 units daily. He is aware to repeat labs in for liver test in 6 months. Lab order was placed for [facility]. Pt verbalized understanding and agreeable with plan.

## 2022-09-12 ENCOUNTER — TELEPHONE (OUTPATIENT)
Dept: GASTROENTEROLOGY | Facility: CLINIC | Age: 52
End: 2022-09-12

## 2022-09-12 RX ORDER — POLYETHYLENE GLYCOL 3350, SODIUM CHLORIDE, SODIUM BICARBONATE, POTASSIUM CHLORIDE 420; 11.2; 5.72; 1.48 G/4L; G/4L; G/4L; G/4L
4000 POWDER, FOR SOLUTION ORAL AS DIRECTED
Qty: 4000 ML | Refills: 0 | Status: SHIPPED | OUTPATIENT
Start: 2022-09-12

## 2022-09-12 NOTE — TELEPHONE ENCOUNTER
Dr. Dillon Matthew    Patient requesting prep to be sent to alternate pharmacy. I pended below to review and sign if looks ok.     Thank you

## 2022-09-12 NOTE — TELEPHONE ENCOUNTER
I called [facility] to see if we could just transfer the prescription from [facility] to their pharmacy. I was told that I need to speak to a pharmacist and put on hold for over 19 minutes. They keep picking up the phone without saying hello or anything and then putting me on hold. I cannot get through to anyone at this [facility]. Dr. Apolonia Willis  Can you please review and sign below pended order if appropriate so he can fill prep at alternate pharmacy. RN can you please follow up tomorrow his procedure is on Wednesday?     Thank you

## 2022-09-14 ENCOUNTER — HOSPITAL ENCOUNTER (OUTPATIENT)
Age: 52
Setting detail: HOSPITAL OUTPATIENT SURGERY
Discharge: HOME | End: 2022-09-14
Attending: INTERNAL MEDICINE | Admitting: INTERNAL MEDICINE
Payer: COMMERCIAL

## 2022-09-14 ENCOUNTER — ANESTHESIA EVENT (OUTPATIENT)
Dept: ENDOSCOPY | Age: 52
End: 2022-09-14
Payer: COMMERCIAL

## 2022-09-14 ENCOUNTER — ANESTHESIA (OUTPATIENT)
Dept: ENDOSCOPY | Age: 52
End: 2022-09-14
Payer: COMMERCIAL

## 2022-09-14 VITALS
TEMPERATURE: 97 F | RESPIRATION RATE: 18 BRPM | SYSTOLIC BLOOD PRESSURE: 123 MMHG | HEIGHT: 68 IN | BODY MASS INDEX: 27.28 KG/M2 | DIASTOLIC BLOOD PRESSURE: 85 MMHG | OXYGEN SATURATION: 96 % | HEART RATE: 72 BPM | WEIGHT: 180 LBS

## 2022-09-14 DIAGNOSIS — Z12.11 COLON CANCER SCREENING: ICD-10-CM

## 2022-09-14 PROCEDURE — 88305 TISSUE EXAM BY PATHOLOGIST: CPT | Performed by: INTERNAL MEDICINE

## 2022-09-14 PROCEDURE — 88341 IMHCHEM/IMCYTCHM EA ADD ANTB: CPT | Performed by: INTERNAL MEDICINE

## 2022-09-14 PROCEDURE — 88342 IMHCHEM/IMCYTCHM 1ST ANTB: CPT | Performed by: INTERNAL MEDICINE

## 2022-09-14 PROCEDURE — 45385 COLONOSCOPY W/LESION REMOVAL: CPT | Performed by: INTERNAL MEDICINE

## 2022-09-14 RX ORDER — NALOXONE HYDROCHLORIDE 0.4 MG/ML
80 INJECTION, SOLUTION INTRAMUSCULAR; INTRAVENOUS; SUBCUTANEOUS AS NEEDED
OUTPATIENT
Start: 2022-09-14 | End: 2022-09-14

## 2022-09-14 RX ORDER — SODIUM CHLORIDE, SODIUM LACTATE, POTASSIUM CHLORIDE, CALCIUM CHLORIDE 600; 310; 30; 20 MG/100ML; MG/100ML; MG/100ML; MG/100ML
INJECTION, SOLUTION INTRAVENOUS CONTINUOUS
Status: DISCONTINUED | OUTPATIENT
Start: 2022-09-14 | End: 2022-09-14

## 2022-09-14 RX ORDER — SODIUM CHLORIDE, SODIUM LACTATE, POTASSIUM CHLORIDE, CALCIUM CHLORIDE 600; 310; 30; 20 MG/100ML; MG/100ML; MG/100ML; MG/100ML
INJECTION, SOLUTION INTRAVENOUS CONTINUOUS
OUTPATIENT
Start: 2022-09-14

## 2022-09-14 RX ORDER — LIDOCAINE HYDROCHLORIDE 10 MG/ML
INJECTION, SOLUTION EPIDURAL; INFILTRATION; INTRACAUDAL; PERINEURAL AS NEEDED
Status: DISCONTINUED | OUTPATIENT
Start: 2022-09-14 | End: 2022-09-14 | Stop reason: SURG

## 2022-09-14 RX ADMIN — SODIUM CHLORIDE, SODIUM LACTATE, POTASSIUM CHLORIDE, CALCIUM CHLORIDE: 600; 310; 30; 20 INJECTION, SOLUTION INTRAVENOUS at 14:04:00

## 2022-09-14 RX ADMIN — LIDOCAINE HYDROCHLORIDE 50 MG: 10 INJECTION, SOLUTION EPIDURAL; INFILTRATION; INTRACAUDAL; PERINEURAL at 14:06:00

## 2022-09-14 NOTE — OPERATIVE REPORT
[facility] Endoscopy Report      Preoperative Diagnosis:  - colon cancer screening  - hematochezia      Postoperative Diagnosis:  - colon polyps x 2  - diverticulosis  - small internal hemorrhoids      Procedure:    Colonoscopy       Surgeon:  Alfonso Wyman M.D. Anesthesia:  MAC sedation    Technique:  After informed consent, the patient was placed in the left lateral recumbent position. Digital rectal examination revealed no palpable intraluminal abnormalities. An Olympus variable stiffness 190 series HD colonoscope was inserted into the rectum and advanced under direct vision by following the lumen to the cecum. The colon was examined upon withdrawal in the left lateral position. The procedure was well tolerated without immediate complication. Findings:  The preparation of the colon was good. The terminal ileum was examined for 4 cm and visually normal.  The ileocecal valve was well preserved. The visualized colonic mucosa from the cecum to the anal verge was normal with an intact vascular pattern. Colon polyps x2 removed as follows;  -Both polyps removed from the sigmoid, first polyp was semipedunculated approximately 8 mm in size removed by cold snare technique. One clip was placed across the mucosal defect. The second polyp in the segment was pedunculated approximately 2 cm in size with a broad stalk, this was removed by hot snare with placement of 2 clips across the residual stalk. Both specimens were retrieved and sent for analysis and both sites were inspected and found to be free of bleeding. Diverticulosis with scattered rare diverticula noted in the sigmoid however occasional diverticula was noted in the transverse colon as well. No evidence of diverticulitis. Small internal hemorrhoids noted on retroflexed view.     Estimated blood loss-insignificant  Specimens-colon polyps      Impression:  - colon polyps x 2  - diverticulosis  - small internal hemorrhoids    Recommendations:  - Post polypectomy instructions given  - Repeat colonoscopy in 3 years  - High fiber diet for diverticular disease  - Symptomatic treatment of hemorrhoids          Carlin Lamb.  Anthony Castellanos MD  9/14/2022  2:35 PM

## 2022-09-14 NOTE — ANESTHESIA POSTPROCEDURE EVALUATION
Patient: Francisco Rashid    Procedure Summary     Date: 09/14/22 Room / Location: [facility] ENDOSCOPY 01 / [facility] ENDO    Anesthesia Start: 1919 Anesthesia Stop: 9848    Procedure: COLONOSCOPY (N/A ) Diagnosis:       Colon cancer screening      (Polyps, diverticulosis, hemorrhoids)    Surgeons: Desiree Luna MD Anesthesiologist:     Anesthesia Type: MAC ASA Status: 2          Anesthesia Type: MAC    Vitals Value Taken Time   /74 09/14/22 1434   Temp 97 09/14/22 1434   Pulse 71 09/14/22 1434   Resp 15 09/14/22 1434   SpO2 97 09/14/22 1434       [facility] Post Evaluation:   Patient Evaluated in PACU  Patient Participation: complete - patient participated  Level of Consciousness: awake and alert  Pain Score: 0  Pain Management: adequate  Airway Patency:patent  Dental exam unchanged from preop  Yes    Cardiovascular Status: acceptable  Respiratory Status: acceptable  Postoperative Hydration acceptable      Maryam Cohn MD  9/14/2022 2:34 PM

## 2022-09-16 ENCOUNTER — TELEPHONE (OUTPATIENT)
Dept: GASTROENTEROLOGY | Facility: CLINIC | Age: 52
End: 2022-09-16

## 2022-09-16 ENCOUNTER — TELEPHONE (OUTPATIENT)
Dept: HEMATOLOGY/ONCOLOGY | Facility: HOSPITAL | Age: 52
End: 2022-09-16

## 2022-09-16 NOTE — TELEPHONE ENCOUNTER
Patient contacted. He wrote down number to call oncology for an appointment. 6 month colonoscopy recall entered into patient outreach in [facility] Next due March 2023. Patient does not have a PCP.

## 2022-09-16 NOTE — TELEPHONE ENCOUNTER
Patient contacted, results of colonoscopy were discussed. The sigmoid colon polyp, the pedunculated larger polyp was noted to have cancerous changes in that, there were some features suggestive of lymphovascular invasion. Discussed this including the likelihood that the the bulk of the tumor was completely removed. Cannot exclude the possibility of potential micro spread however this is typically likely low. And lieu of the above discussion like the patient to see one of her cancer specialist, he was unable to write down the phone number so please nursing staff give him the number for our oncology department and place him on the callback list for 6 months for colonoscopy. He will notify his primary relatives so they can make sure they undergo proper colon screening protocols.      Forward results to PCP

## 2022-09-16 NOTE — TELEPHONE ENCOUNTER
Patient called for consult. Malignant polyp removed from colon. Referred by Dr. Jp Martinez. Please call.  Thank you

## 2022-09-21 ENCOUNTER — OFFICE VISIT (OUTPATIENT)
Dept: HEMATOLOGY/ONCOLOGY | Facility: HOSPITAL | Age: 52
End: 2022-09-21
Attending: INTERNAL MEDICINE

## 2022-09-21 VITALS
SYSTOLIC BLOOD PRESSURE: 126 MMHG | HEIGHT: 67 IN | TEMPERATURE: 98 F | DIASTOLIC BLOOD PRESSURE: 72 MMHG | OXYGEN SATURATION: 97 % | HEART RATE: 66 BPM | BODY MASS INDEX: 28.72 KG/M2 | RESPIRATION RATE: 16 BRPM | WEIGHT: 183 LBS

## 2022-09-21 DIAGNOSIS — C18.9 MALIGNANT NEOPLASM OF COLON, UNSPECIFIED PART OF COLON (HCC): Primary | ICD-10-CM

## 2022-09-21 PROCEDURE — 99211 OFF/OP EST MAY X REQ PHY/QHP: CPT

## 2022-09-22 ENCOUNTER — TELEPHONE (OUTPATIENT)
Dept: SURGERY | Facility: CLINIC | Age: 52
End: 2022-09-22

## 2022-09-22 NOTE — CONSULTS
[facility]    PATIENT'S NAME: DELORES DELGADO   CONSULTING PHYSICIAN: Rik Quintanilla. Latanya Valencia MD   PATIENT ACCOUNT #: [de-identified] LOCATION: [address] [place] [address] RECORD #: [id number] YOB: 1970   CONSULTATION DATE: 09/21/2022       [facility] NEW PATIENT CONSULT    REQUESTING PHYSICIAN:  Fartun Bautista. Marissa Oglesby MD     REASON FOR CONSULTATION:  Sigmoid colon adenocarcinoma. 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 The patient had a routine colonoscopy and had 2 sigmoid colon polyps removed with 1 showing a prominent infiltrating moderately to poorly differentiated adenocarcinoma, measuring 1.5 cm in maximum dimension. There were a few foci of lymphovascular invasion near the leading edge of the tumor, and the tumor showed infiltration to the submucosal soft tissues. Cauterized margins were free of malignancy with the closest margin of 1 mm. The patient presents to Medical Oncology to discuss any further workup and treatment. He is feeling well overall. He states he previously did have some bleeding that has resolved. He denies any fevers, chills, night sweats, or unintentional weight loss. His energy level is good. PAST MEDICAL HISTORY:  Rheumatoid arthritis. MEDICATIONS:  Plaquenil. ALLERGIES:  Sulfa. SOCIAL HISTORY:  He does drink beer regularly. He denies any tobacco use. He denies any illicit drug use. FAMILY MEDICAL HISTORY:  No history of any malignancy in the family, specifically mother and father. REVIEW OF SYSTEMS:  All other systems reviewed, negative x12. PHYSICAL EXAMINATION:    GENERAL:  No acute distress. Alert and oriented. VITAL SIGNS:  ECOG performance status is 0. Weight is 83 kg.   Blood pressure is 126/72, pulse 66, respiratory rate 16, pulse ox 97% on room air, temperature 98.3 Fahrenheit. HEENT:  Moist mucous membranes. Oropharynx clear. NECK:  Supple. LUNGS:  Symmetric expansion. HEART:  Good distal pulses. ABDOMEN:  Soft. EXTREMITIES:  No edema. SKIN:  No visible or palpable lesions. LYMPHATICS:  No visible adenopathy. NEUROLOGIC:  Moving all extremities. Cranial nerves intact. PSYCHIATRIC:  Appropriate mood, appropriate affect. MUSCULOSKELETAL:  No deformity. LABORATORY DATA:  Pathology results as per HPI. Most recent labs in our system from 35/25/8137:  Metabolic panel showing bilirubin 0.7, creatinine 1.0. CBC review 08/08/2022 showing white blood cell count 7000, hemoglobin 15.4, platelets 838,824. Also review of operative report from colonoscopy 09/14/2022 with Dr. David Wilson. ASSESSMENT AND PLAN:  The patient is a pleasant 51-year-old male being evaluated by Medical Oncology for a moderately to poorly differentiated adenocarcinoma discovered on colonoscopy from a removed sigmoid colon polyp 09/14/2022, as outlined above. The patient's polyp margins were free of dysplasia or malignancy; however, he does have a high-risk feature with lymphovascular invasion. Typically with a high-risk feature we recommend further resection, and we will have him meet with Surgical Oncology to consider robotic surgery. We will set him up for imaging prior to evaluation. The case will also be reviewed at genitourinary tumor conference in the coming week. Thank you very much for this consultation request and allowing us to participate in the care of this delightful patient. Dictated By Harjit Alegre MD  d: 09/21/2022 15:35:07  t: 09/21/2022 22:20:19  Malva Curling [id number]  [facility]/    cc: Columba Thompson.  Oscar Jacobo MD

## 2022-09-22 NOTE — TELEPHONE ENCOUNTER
Contacted pt for new consultation with Dr. Jay Flowers. Pt scheduled for CT scan on 9/27 and consult on 9/28 at 10:30am. Appointment instructions reviewed with patient. Pt verbalized understanding and agreeable to the plan of care.

## 2022-09-23 ENCOUNTER — MED REC SCAN ONLY (OUTPATIENT)
Dept: GASTROENTEROLOGY | Facility: CLINIC | Age: 52
End: 2022-09-23

## 2022-09-27 ENCOUNTER — HOSPITAL ENCOUNTER (OUTPATIENT)
Dept: CT IMAGING | Facility: HOSPITAL | Age: 52
Discharge: HOME | End: 2022-09-27
Attending: INTERNAL MEDICINE

## 2022-09-27 DIAGNOSIS — C18.9 MALIGNANT NEOPLASM OF COLON, UNSPECIFIED PART OF COLON (HCC): ICD-10-CM

## 2022-09-27 LAB
CREAT BLD-MCNC: 1.1 MG/DL
GFR SERPLBLD BASED ON 1.73 SQ M-ARVRAT: 82 ML/MIN/1.73M2 (ref 60–?)

## 2022-09-27 PROCEDURE — 74177 CT ABD & PELVIS W/CONTRAST: CPT | Performed by: INTERNAL MEDICINE

## 2022-09-27 PROCEDURE — 82565 ASSAY OF CREATININE: CPT

## 2022-09-27 PROCEDURE — 71260 CT THORAX DX C+: CPT | Performed by: INTERNAL MEDICINE

## 2022-09-28 ENCOUNTER — OFFICE VISIT (OUTPATIENT)
Dept: SURGERY | Facility: CLINIC | Age: 52
End: 2022-09-28
Payer: COMMERCIAL

## 2022-09-28 VITALS
OXYGEN SATURATION: 97 % | BODY MASS INDEX: 28.63 KG/M2 | RESPIRATION RATE: 18 BRPM | DIASTOLIC BLOOD PRESSURE: 81 MMHG | WEIGHT: 182.38 LBS | HEART RATE: 65 BPM | SYSTOLIC BLOOD PRESSURE: 119 MMHG | TEMPERATURE: 97 F | HEIGHT: 67 IN

## 2022-09-28 DIAGNOSIS — C18.7 ADENOCARCINOMA OF SIGMOID COLON (HCC): Primary | ICD-10-CM

## 2022-09-28 PROCEDURE — 3008F BODY MASS INDEX DOCD: CPT | Performed by: SURGERY

## 2022-09-28 PROCEDURE — 3074F SYST BP LT 130 MM HG: CPT | Performed by: SURGERY

## 2022-09-28 PROCEDURE — 82378 CARCINOEMBRYONIC ANTIGEN: CPT | Performed by: SURGERY

## 2022-09-28 PROCEDURE — 3079F DIAST BP 80-89 MM HG: CPT | Performed by: SURGERY

## 2022-09-28 PROCEDURE — 99245 OFF/OP CONSLTJ NEW/EST HI 55: CPT | Performed by: SURGERY

## 2022-09-30 ENCOUNTER — TELEPHONE (OUTPATIENT)
Dept: SURGERY | Facility: CLINIC | Age: 52
End: 2022-09-30

## 2022-09-30 NOTE — TELEPHONE ENCOUNTER
Patient called back stating he is interested in moving forward with surgery. He has an important event on in October and would prefer to wait until after 10/31. Appointment to discuss preoperative instructions scheduled for Wednesday October 5 at 11am.  Also discussed CEA results from clinic. All questions answered to the best of my ability. Mickey Dias PA-C  Department of [address] [facility] [place] [address] [address], [address] [place] [address]  T: [phone]  F: [phone]

## 2022-10-03 DIAGNOSIS — C18.7 ADENOCARCINOMA OF SIGMOID COLON (HCC): Primary | ICD-10-CM

## 2022-10-04 ENCOUNTER — TELEPHONE (OUTPATIENT)
Dept: SURGERY | Facility: CLINIC | Age: 52
End: 2022-10-04

## 2022-10-04 ENCOUNTER — TELEPHONE (OUTPATIENT)
Dept: GASTROENTEROLOGY | Facility: CLINIC | Age: 52
End: 2022-10-04

## 2022-10-04 NOTE — TELEPHONE ENCOUNTER
Contacted GI procedure  requesting Dr. Maria Antonia Amaya or one of his partner to perform lower scope with possible tatoo at time of surgery with Dr. Boni Chauhan on 11/7 at [place] at 7:30am.

## 2022-10-04 NOTE — TELEPHONE ENCOUNTER
GI Rns-    Sheyla from  [facility]) called and stated patient is having surgery with Dr. Enrique Courser on Nov 7th. Office is requesting patient get  lower endoscopy.      Please advise    Can reach Karina Adam 8141 at [phone]

## 2022-10-04 NOTE — TELEPHONE ENCOUNTER
Left message for Hoang Rubio to call back regarding why patient needs to have a colonoscopy prior to surgery. Please follow up. Thank you.

## 2022-10-05 ENCOUNTER — EKG ENCOUNTER (OUTPATIENT)
Dept: LAB | Facility: HOSPITAL | Age: 52
End: 2022-10-05
Attending: PHYSICIAN ASSISTANT
Payer: COMMERCIAL

## 2022-10-05 ENCOUNTER — OFFICE VISIT (OUTPATIENT)
Dept: SURGERY | Facility: CLINIC | Age: 52
End: 2022-10-05
Payer: COMMERCIAL

## 2022-10-05 ENCOUNTER — TELEPHONE (OUTPATIENT)
Dept: SURGERY | Facility: CLINIC | Age: 52
End: 2022-10-05

## 2022-10-05 DIAGNOSIS — Z01.818 PREOP TESTING: ICD-10-CM

## 2022-10-05 DIAGNOSIS — C18.7 ADENOCARCINOMA OF SIGMOID COLON (HCC): Primary | ICD-10-CM

## 2022-10-05 DIAGNOSIS — Z01.818 PREOP TESTING: Primary | ICD-10-CM

## 2022-10-05 LAB
ALBUMIN SERPL-MCNC: 3.8 G/DL (ref 3.4–5)
ALBUMIN/GLOB SERPL: 1.2 {RATIO} (ref 1–2)
ALP LIVER SERPL-CCNC: 101 U/L
ALT SERPL-CCNC: 69 U/L
ANION GAP SERPL CALC-SCNC: 7 MMOL/L (ref 0–18)
AST SERPL-CCNC: 32 U/L (ref 15–37)
BASOPHILS # BLD AUTO: 0.04 X10(3) UL (ref 0–0.2)
BASOPHILS NFR BLD AUTO: 0.5 %
BILIRUB SERPL-MCNC: 0.8 MG/DL (ref 0.1–2)
BUN BLD-MCNC: 13 MG/DL (ref 7–18)
BUN/CREAT SERPL: 13.1 (ref 10–20)
CALCIUM BLD-MCNC: 9 MG/DL (ref 8.5–10.1)
CHLORIDE SERPL-SCNC: 105 MMOL/L (ref 98–112)
CO2 SERPL-SCNC: 26 MMOL/L (ref 21–32)
CREAT BLD-MCNC: 0.99 MG/DL
DEPRECATED RDW RBC AUTO: 40.6 FL (ref 35.1–46.3)
EOSINOPHIL # BLD AUTO: 0.29 X10(3) UL (ref 0–0.7)
EOSINOPHIL NFR BLD AUTO: 3.8 %
ERYTHROCYTE [DISTWIDTH] IN BLOOD BY AUTOMATED COUNT: 11.9 % (ref 11–15)
FASTING STATUS PATIENT QL REPORTED: NO
GFR SERPLBLD BASED ON 1.73 SQ M-ARVRAT: 93 ML/MIN/1.73M2 (ref 60–?)
GLOBULIN PLAS-MCNC: 3.3 G/DL (ref 2.8–4.4)
GLUCOSE BLD-MCNC: 98 MG/DL (ref 70–99)
HCT VFR BLD AUTO: 42.8 %
HGB BLD-MCNC: 14.8 G/DL
IMM GRANULOCYTES # BLD AUTO: 0.02 X10(3) UL (ref 0–1)
IMM GRANULOCYTES NFR BLD: 0.3 %
LYMPHOCYTES # BLD AUTO: 1.87 X10(3) UL (ref 1–4)
LYMPHOCYTES NFR BLD AUTO: 24.4 %
MCH RBC QN AUTO: 32.2 PG (ref 26–34)
MCHC RBC AUTO-ENTMCNC: 34.6 G/DL (ref 31–37)
MCV RBC AUTO: 93.2 FL
MONOCYTES # BLD AUTO: 0.69 X10(3) UL (ref 0.1–1)
MONOCYTES NFR BLD AUTO: 9 %
NEUTROPHILS # BLD AUTO: 4.75 X10 (3) UL (ref 1.5–7.7)
NEUTROPHILS # BLD AUTO: 4.75 X10(3) UL (ref 1.5–7.7)
NEUTROPHILS NFR BLD AUTO: 62 %
OSMOLALITY SERPL CALC.SUM OF ELEC: 286 MOSM/KG (ref 275–295)
PLATELET # BLD AUTO: 217 10(3)UL (ref 150–450)
POTASSIUM SERPL-SCNC: 4.2 MMOL/L (ref 3.5–5.1)
PROT SERPL-MCNC: 7.1 G/DL (ref 6.4–8.2)
RBC # BLD AUTO: 4.59 X10(6)UL
SODIUM SERPL-SCNC: 138 MMOL/L (ref 136–145)
WBC # BLD AUTO: 7.7 X10(3) UL (ref 4–11)

## 2022-10-05 PROCEDURE — 80053 COMPREHEN METABOLIC PANEL: CPT

## 2022-10-05 PROCEDURE — 36415 COLL VENOUS BLD VENIPUNCTURE: CPT

## 2022-10-05 PROCEDURE — 93005 ELECTROCARDIOGRAM TRACING: CPT

## 2022-10-05 PROCEDURE — 93010 ELECTROCARDIOGRAM REPORT: CPT | Performed by: PHYSICIAN ASSISTANT

## 2022-10-05 PROCEDURE — 85025 COMPLETE CBC W/AUTO DIFF WBC: CPT

## 2022-10-05 RX ORDER — NEOMYCIN SULFATE 500 MG/1
TABLET ORAL
Qty: 6 TABLET | Refills: 0 | Status: SHIPPED | OUTPATIENT
Start: 2022-10-05

## 2022-10-05 RX ORDER — METRONIDAZOLE 500 MG/1
500 TABLET ORAL 3 TIMES DAILY
Qty: 3 TABLET | Refills: 0 | Status: SHIPPED | OUTPATIENT
Start: 2022-10-05

## 2022-10-05 NOTE — TELEPHONE ENCOUNTER
Dr. Apolonia Willis    See other TE from 10/5/2022 under Tavon Galeano RN:    She contacted our office to see if you could perform lower scope with possible tatoo at time of surgery with Dr. Elisa Yoo on 11/7 at [place] at 7:30am.    Kwadwo Jimenez are at [address]  [place] that day and already have a 7:30am case and a full day of scopes    Please advise    Thank you

## 2022-10-05 NOTE — TELEPHONE ENCOUNTER
Spoke with Dr. Lexus Prasad in regards to this pt, he will discuss with Sheyla/RN at Dr. Horner Page office. Sheyla's ext was provided to him. Per Indio Batres, the pt's surgery date has been changed to 10/17/22 at 1:30 pm.    Dr. Lexus Prasad is the only available provider for that date & time. I will await his response before reaching out to the pt. Dr. Lexus Prasad, Dr. Cynthia Olivera!

## 2022-10-05 NOTE — TELEPHONE ENCOUNTER
Either Casandra Worthington or myself will do the colonoscopy in the OR the day that surgical oncology is requesting. They will call us when the patient is ready for intraoperative colonoscopy and one of us will do the procedure and see if we can find the polyp and tattoo it.

## 2022-10-05 NOTE — TELEPHONE ENCOUNTER
I would have to move my 7:30 and 8 am cases at [facility] or we could see if another GI is available

## 2022-10-05 NOTE — TELEPHONE ENCOUNTER
Contacted pt, agreeable to surgery date of 10/17 at [facility] with Dr. Harley Smith.

## 2022-10-05 NOTE — TELEPHONE ENCOUNTER
Schedulers:  I spoke to [place] with Dr. Lucero Baxter office. They need either Dr. Jordyn Phillips or one of his partners to be available for patient's 11/7/22 procedure the endoscopy portion would be at start of case 7:30am per [place]ell. They need the scope done because there is not tattoo so would need a colonoscopy with tattoo at time of surgery by Dr. Jordyn Phillips or one of our other providers. Please see below message from Dr. Jordyn Phillips.     Please call Hunnewell with Dr. Lucero Baxter office to coordinate she is at [phone]

## 2022-10-06 ENCOUNTER — TELEPHONE (OUTPATIENT)
Dept: SURGERY | Facility: CLINIC | Age: 52
End: 2022-10-06

## 2022-10-06 NOTE — TELEPHONE ENCOUNTER
LVMTCB regarding preoperative testing results. Kyra Veras PA-C  Department of [address] [facility]  [address] [facility] [place] [facility] [place] [address] [address], [address]  T: [phone]  F: [phone]

## 2022-10-06 NOTE — TELEPHONE ENCOUNTER
Pt called back to discuss pre-op testing results, Recommending medical clearance after reviewing pt's EKG. Scheduled pt to see Dr. Trav Reyes at 11:30am on 10/10 to establish care and for a pre-op exam. Pt agreeable to plan of care and appreciated the care coordination.

## 2022-10-10 ENCOUNTER — TELEPHONE (OUTPATIENT)
Dept: FAMILY MEDICINE CLINIC | Facility: CLINIC | Age: 52
End: 2022-10-10

## 2022-10-10 NOTE — TELEPHONE ENCOUNTER
Kendal Henry from Surgery scheduling called in to speak to a  has questions please call  # [phone]

## 2022-10-10 NOTE — TELEPHONE ENCOUNTER
CSS: see messages below. (needs appt asap)    Incoming call from [facility] specialist; patient scheduled for surgery 10/17/22. Patient has an abnormal EKG. Patient had labs done 10/5/22. He needs preop clearance.

## 2022-10-10 NOTE — TELEPHONE ENCOUNTER
Left message for patient to call back. Please assist this patient to schedule a pre-op visit anytime this week as a double-book or res24 per Dr. Rebolledo Ou permission as he has surgery scheduled for 10/17.

## 2022-10-11 ENCOUNTER — OFFICE VISIT (OUTPATIENT)
Dept: FAMILY MEDICINE CLINIC | Facility: CLINIC | Age: 52
End: 2022-10-11
Payer: COMMERCIAL

## 2022-10-11 ENCOUNTER — LAB ENCOUNTER (OUTPATIENT)
Dept: LAB | Age: 52
End: 2022-10-11
Attending: FAMILY MEDICINE
Payer: COMMERCIAL

## 2022-10-11 VITALS
RESPIRATION RATE: 18 BRPM | BODY MASS INDEX: 27.89 KG/M2 | SYSTOLIC BLOOD PRESSURE: 115 MMHG | WEIGHT: 184 LBS | DIASTOLIC BLOOD PRESSURE: 77 MMHG | HEIGHT: 68 IN | OXYGEN SATURATION: 99 % | HEART RATE: 67 BPM

## 2022-10-11 DIAGNOSIS — Z01.818 PREOPERATIVE TESTING: ICD-10-CM

## 2022-10-11 DIAGNOSIS — E78.5 HYPERLIPIDEMIA, UNSPECIFIED HYPERLIPIDEMIA TYPE: ICD-10-CM

## 2022-10-11 DIAGNOSIS — Z01.818 PREOPERATIVE EXAMINATION: Primary | ICD-10-CM

## 2022-10-11 DIAGNOSIS — Z12.5 SCREENING PSA (PROSTATE SPECIFIC ANTIGEN): ICD-10-CM

## 2022-10-11 DIAGNOSIS — M06.9 RHEUMATOID ARTHRITIS, INVOLVING UNSPECIFIED SITE, UNSPECIFIED WHETHER RHEUMATOID FACTOR PRESENT (HCC): ICD-10-CM

## 2022-10-11 LAB
ANTIBODY SCREEN: NEGATIVE
COMPLEXED PSA SERPL-MCNC: 3.51 NG/ML (ref ?–4)
RH BLOOD TYPE: POSITIVE
RH BLOOD TYPE: POSITIVE

## 2022-10-11 PROCEDURE — 90750 HZV VACC RECOMBINANT IM: CPT | Performed by: FAMILY MEDICINE

## 2022-10-11 PROCEDURE — 3074F SYST BP LT 130 MM HG: CPT | Performed by: FAMILY MEDICINE

## 2022-10-11 PROCEDURE — 99203 OFFICE O/P NEW LOW 30 MIN: CPT | Performed by: FAMILY MEDICINE

## 2022-10-11 PROCEDURE — 86850 RBC ANTIBODY SCREEN: CPT

## 2022-10-11 PROCEDURE — 3078F DIAST BP <80 MM HG: CPT | Performed by: FAMILY MEDICINE

## 2022-10-11 PROCEDURE — 3008F BODY MASS INDEX DOCD: CPT | Performed by: FAMILY MEDICINE

## 2022-10-11 PROCEDURE — 86900 BLOOD TYPING SEROLOGIC ABO: CPT

## 2022-10-11 PROCEDURE — 36415 COLL VENOUS BLD VENIPUNCTURE: CPT

## 2022-10-11 PROCEDURE — 86901 BLOOD TYPING SEROLOGIC RH(D): CPT

## 2022-10-11 PROCEDURE — 90715 TDAP VACCINE 7 YRS/> IM: CPT | Performed by: FAMILY MEDICINE

## 2022-10-11 PROCEDURE — 90472 IMMUNIZATION ADMIN EACH ADD: CPT | Performed by: FAMILY MEDICINE

## 2022-10-11 PROCEDURE — 90686 IIV4 VACC NO PRSV 0.5 ML IM: CPT | Performed by: FAMILY MEDICINE

## 2022-10-11 PROCEDURE — 90471 IMMUNIZATION ADMIN: CPT | Performed by: FAMILY MEDICINE

## 2022-10-12 ENCOUNTER — TELEPHONE (OUTPATIENT)
Dept: GASTROENTEROLOGY | Facility: CLINIC | Age: 52
End: 2022-10-12

## 2022-10-12 DIAGNOSIS — C18.7 CANCER OF SIGMOID COLON (HCC): Primary | ICD-10-CM

## 2022-10-12 RX ORDER — SODIUM CHLORIDE, SODIUM LACTATE, POTASSIUM CHLORIDE, CALCIUM CHLORIDE 600; 310; 30; 20 MG/100ML; MG/100ML; MG/100ML; MG/100ML
INJECTION, SOLUTION INTRAVENOUS CONTINUOUS
Status: CANCELLED | OUTPATIENT
Start: 2022-10-12

## 2022-10-12 NOTE — TELEPHONE ENCOUNTER
Pt's Colonoscopy will be performed in the OR. Pt to have surgery with Dr. Jaya Wolfe immediately after. Scheduled for:  Colonoscopy - 12673      Provider Name:  Dr. Dru Templeton  Date:  10/17/22  Location:  [facility] OR  Sedation:  MAC  Time:  1:30 pm (pt is aware to arrive at 12:30 pm)  Prep:  Prep given by Dr. Mary Hager office  Meds/Allergies Reconciled?:  Yes, Physician reviewed. Diagnosis with codes:  Colon cancer - C18.7 (per [facility]'s office)  Was patient informed to call insurance with codes (Y/N):  Yes, I confirmed BCBS PPO insurance with this patient. Referral sent?:  Yes, referral sent. Clare Larry in [facility] notified. [facility] or [facility]  notified?:  Yes, Endo notified with what/who Dru Templeton will need for procedure. Author Jared, surgery scheduler for [facility] office notified as well. Medication Orders:  N/A    Misc Orders:  N/A     Further instructions given by staff:  Prep instructions given to pt by Dr. Mary Hager office. Nothing further needed from our office.

## 2022-10-14 ENCOUNTER — LAB ENCOUNTER (OUTPATIENT)
Dept: LAB | Age: 52
End: 2022-10-14
Attending: SURGERY
Payer: COMMERCIAL

## 2022-10-14 DIAGNOSIS — Z01.818 PREOPERATIVE TESTING: ICD-10-CM

## 2022-10-15 LAB — SARS-COV-2 RNA RESP QL NAA+PROBE: NOT DETECTED

## 2022-10-17 ENCOUNTER — ANESTHESIA (OUTPATIENT)
Dept: SURGERY | Facility: HOSPITAL | Age: 52
End: 2022-10-17
Payer: COMMERCIAL

## 2022-10-17 ENCOUNTER — HOSPITAL ENCOUNTER (INPATIENT)
Facility: HOSPITAL | Age: 52
LOS: 3 days | Discharge: HOME | End: 2022-10-20
Attending: SURGERY | Admitting: SURGERY
Payer: COMMERCIAL

## 2022-10-17 ENCOUNTER — ANESTHESIA EVENT (OUTPATIENT)
Dept: SURGERY | Facility: HOSPITAL | Age: 52
End: 2022-10-17
Payer: COMMERCIAL

## 2022-10-17 DIAGNOSIS — C18.7 ADENOCARCINOMA OF SIGMOID COLON (HCC): ICD-10-CM

## 2022-10-17 DIAGNOSIS — Z01.818 PREOPERATIVE TESTING: Primary | ICD-10-CM

## 2022-10-17 PROBLEM — C18.9 ADENOCARCINOMA, COLON (HCC): Status: ACTIVE | Noted: 2022-10-17

## 2022-10-17 PROBLEM — M06.9 RHEUMATOID ARTHRITIS (HCC): Chronic | Status: ACTIVE | Noted: 2022-10-17

## 2022-10-17 PROCEDURE — 99232 SBSQ HOSP IP/OBS MODERATE 35: CPT | Performed by: HOSPITALIST

## 2022-10-17 PROCEDURE — 3E0H8KZ INTRODUCTION OF OTHER DIAGNOSTIC SUBSTANCE INTO LOWER GI, VIA NATURAL OR ARTIFICIAL OPENING ENDOSCOPIC: ICD-10-PCS | Performed by: STUDENT IN AN ORGANIZED HEALTH CARE EDUCATION/TRAINING PROGRAM

## 2022-10-17 PROCEDURE — 8E0W4CZ ROBOTIC ASSISTED PROCEDURE OF TRUNK REGION, PERCUTANEOUS ENDOSCOPIC APPROACH: ICD-10-PCS | Performed by: SURGERY

## 2022-10-17 PROCEDURE — 0DJD8ZZ INSPECTION OF LOWER INTESTINAL TRACT, VIA NATURAL OR ARTIFICIAL OPENING ENDOSCOPIC: ICD-10-PCS | Performed by: STUDENT IN AN ORGANIZED HEALTH CARE EDUCATION/TRAINING PROGRAM

## 2022-10-17 PROCEDURE — 0DTG4ZZ RESECTION OF LEFT LARGE INTESTINE, PERCUTANEOUS ENDOSCOPIC APPROACH: ICD-10-PCS | Performed by: SURGERY

## 2022-10-17 RX ORDER — HEPARIN SODIUM 5000 [USP'U]/ML
5000 INJECTION, SOLUTION INTRAVENOUS; SUBCUTANEOUS ONCE
Status: COMPLETED | OUTPATIENT
Start: 2022-10-17 | End: 2022-10-17

## 2022-10-17 RX ORDER — HYDROMORPHONE HYDROCHLORIDE 1 MG/ML
0.2 INJECTION, SOLUTION INTRAMUSCULAR; INTRAVENOUS; SUBCUTANEOUS EVERY 2 HOUR PRN
Status: DISCONTINUED | OUTPATIENT
Start: 2022-10-17 | End: 2022-10-19

## 2022-10-17 RX ORDER — NALOXONE HYDROCHLORIDE 0.4 MG/ML
80 INJECTION, SOLUTION INTRAMUSCULAR; INTRAVENOUS; SUBCUTANEOUS AS NEEDED
Status: DISCONTINUED | OUTPATIENT
Start: 2022-10-17 | End: 2022-10-17 | Stop reason: HOSPADM

## 2022-10-17 RX ORDER — MORPHINE SULFATE 4 MG/ML
4 INJECTION, SOLUTION INTRAMUSCULAR; INTRAVENOUS EVERY 10 MIN PRN
Status: DISCONTINUED | OUTPATIENT
Start: 2022-10-17 | End: 2022-10-17 | Stop reason: HOSPADM

## 2022-10-17 RX ORDER — LIDOCAINE HYDROCHLORIDE ANHYDROUS AND DEXTROSE MONOHYDRATE .8; 5 G/100ML; G/100ML
INJECTION, SOLUTION INTRAVENOUS CONTINUOUS PRN
Status: DISCONTINUED | OUTPATIENT
Start: 2022-10-17 | End: 2022-10-17 | Stop reason: SURG

## 2022-10-17 RX ORDER — HYDROMORPHONE HYDROCHLORIDE 1 MG/ML
0.6 INJECTION, SOLUTION INTRAMUSCULAR; INTRAVENOUS; SUBCUTANEOUS EVERY 5 MIN PRN
Status: DISCONTINUED | OUTPATIENT
Start: 2022-10-17 | End: 2022-10-17 | Stop reason: HOSPADM

## 2022-10-17 RX ORDER — HYDROMORPHONE HYDROCHLORIDE 1 MG/ML
0.4 INJECTION, SOLUTION INTRAMUSCULAR; INTRAVENOUS; SUBCUTANEOUS EVERY 2 HOUR PRN
Status: DISCONTINUED | OUTPATIENT
Start: 2022-10-17 | End: 2022-10-19

## 2022-10-17 RX ORDER — MORPHINE SULFATE 10 MG/ML
6 INJECTION, SOLUTION INTRAMUSCULAR; INTRAVENOUS EVERY 10 MIN PRN
Status: DISCONTINUED | OUTPATIENT
Start: 2022-10-17 | End: 2022-10-17 | Stop reason: HOSPADM

## 2022-10-17 RX ORDER — ACETAMINOPHEN 500 MG
1000 TABLET ORAL EVERY 6 HOURS
Status: DISCONTINUED | OUTPATIENT
Start: 2022-10-17 | End: 2022-10-20

## 2022-10-17 RX ORDER — MAGNESIUM HYDROXIDE 1200 MG/15ML
LIQUID ORAL CONTINUOUS PRN
Status: COMPLETED | OUTPATIENT
Start: 2022-10-17 | End: 2022-10-17

## 2022-10-17 RX ORDER — ACETAMINOPHEN 10 MG/ML
1000 INJECTION, SOLUTION INTRAVENOUS EVERY 6 HOURS
Status: CANCELLED | OUTPATIENT
Start: 2022-10-17

## 2022-10-17 RX ORDER — SODIUM CHLORIDE 9 MG/ML
INJECTION, SOLUTION INTRAVENOUS ONCE
Status: COMPLETED | OUTPATIENT
Start: 2022-10-17 | End: 2022-10-17

## 2022-10-17 RX ORDER — METRONIDAZOLE 500 MG/100ML
500 INJECTION, SOLUTION INTRAVENOUS ONCE
Status: COMPLETED | OUTPATIENT
Start: 2022-10-17 | End: 2022-10-17

## 2022-10-17 RX ORDER — PROCHLORPERAZINE EDISYLATE 5 MG/ML
5 INJECTION INTRAMUSCULAR; INTRAVENOUS EVERY 8 HOURS PRN
Status: DISCONTINUED | OUTPATIENT
Start: 2022-10-17 | End: 2022-10-17 | Stop reason: HOSPADM

## 2022-10-17 RX ORDER — OXYCODONE HYDROCHLORIDE 5 MG/1
10 TABLET ORAL EVERY 4 HOURS PRN
Status: DISCONTINUED | OUTPATIENT
Start: 2022-10-17 | End: 2022-10-20

## 2022-10-17 RX ORDER — ONDANSETRON 2 MG/ML
4 INJECTION INTRAMUSCULAR; INTRAVENOUS EVERY 6 HOURS PRN
Status: DISCONTINUED | OUTPATIENT
Start: 2022-10-17 | End: 2022-10-20

## 2022-10-17 RX ORDER — ACETAMINOPHEN 500 MG
1000 TABLET ORAL EVERY 6 HOURS PRN
COMMUNITY
End: 2022-10-28 | Stop reason: ALTCHOICE

## 2022-10-17 RX ORDER — HYDROMORPHONE HYDROCHLORIDE 1 MG/ML
0.2 INJECTION, SOLUTION INTRAMUSCULAR; INTRAVENOUS; SUBCUTANEOUS EVERY 5 MIN PRN
Status: DISCONTINUED | OUTPATIENT
Start: 2022-10-17 | End: 2022-10-17 | Stop reason: HOSPADM

## 2022-10-17 RX ORDER — BUPIVACAINE HYDROCHLORIDE 5 MG/ML
INJECTION, SOLUTION EPIDURAL; INTRACAUDAL AS NEEDED
Status: DISCONTINUED | OUTPATIENT
Start: 2022-10-17 | End: 2022-10-17 | Stop reason: HOSPADM

## 2022-10-17 RX ORDER — ACETAMINOPHEN 500 MG
1000 TABLET ORAL ONCE
Status: DISCONTINUED | OUTPATIENT
Start: 2022-10-17 | End: 2022-10-17 | Stop reason: HOSPADM

## 2022-10-17 RX ORDER — SODIUM CHLORIDE 9 MG/ML
INJECTION, SOLUTION INTRAVENOUS CONTINUOUS PRN
Status: DISCONTINUED | OUTPATIENT
Start: 2022-10-17 | End: 2022-10-17 | Stop reason: SURG

## 2022-10-17 RX ORDER — SODIUM CHLORIDE, SODIUM LACTATE, POTASSIUM CHLORIDE, CALCIUM CHLORIDE 600; 310; 30; 20 MG/100ML; MG/100ML; MG/100ML; MG/100ML
INJECTION, SOLUTION INTRAVENOUS CONTINUOUS
Status: DISCONTINUED | OUTPATIENT
Start: 2022-10-17 | End: 2022-10-18

## 2022-10-17 RX ORDER — INDOCYANINE GREEN AND WATER 25 MG
KIT INJECTION AS NEEDED
Status: DISCONTINUED | OUTPATIENT
Start: 2022-10-17 | End: 2022-10-17 | Stop reason: SURG

## 2022-10-17 RX ORDER — LABETALOL HYDROCHLORIDE 5 MG/ML
INJECTION, SOLUTION INTRAVENOUS AS NEEDED
Status: DISCONTINUED | OUTPATIENT
Start: 2022-10-17 | End: 2022-10-17 | Stop reason: SURG

## 2022-10-17 RX ORDER — ENOXAPARIN SODIUM 100 MG/ML
40 INJECTION SUBCUTANEOUS DAILY
Status: DISCONTINUED | OUTPATIENT
Start: 2022-10-18 | End: 2022-10-20

## 2022-10-17 RX ORDER — SODIUM CHLORIDE, SODIUM LACTATE, POTASSIUM CHLORIDE, CALCIUM CHLORIDE 600; 310; 30; 20 MG/100ML; MG/100ML; MG/100ML; MG/100ML
INJECTION, SOLUTION INTRAVENOUS CONTINUOUS PRN
Status: DISCONTINUED | OUTPATIENT
Start: 2022-10-17 | End: 2022-10-17 | Stop reason: SURG

## 2022-10-17 RX ORDER — LIDOCAINE HYDROCHLORIDE 10 MG/ML
INJECTION, SOLUTION EPIDURAL; INFILTRATION; INTRACAUDAL; PERINEURAL AS NEEDED
Status: DISCONTINUED | OUTPATIENT
Start: 2022-10-17 | End: 2022-10-17 | Stop reason: SURG

## 2022-10-17 RX ORDER — ROCURONIUM BROMIDE 10 MG/ML
INJECTION, SOLUTION INTRAVENOUS AS NEEDED
Status: DISCONTINUED | OUTPATIENT
Start: 2022-10-17 | End: 2022-10-17 | Stop reason: SURG

## 2022-10-17 RX ORDER — HYDROMORPHONE HYDROCHLORIDE 1 MG/ML
0.8 INJECTION, SOLUTION INTRAMUSCULAR; INTRAVENOUS; SUBCUTANEOUS EVERY 2 HOUR PRN
Status: DISCONTINUED | OUTPATIENT
Start: 2022-10-17 | End: 2022-10-19

## 2022-10-17 RX ORDER — ONDANSETRON 2 MG/ML
4 INJECTION INTRAMUSCULAR; INTRAVENOUS EVERY 6 HOURS PRN
Status: DISCONTINUED | OUTPATIENT
Start: 2022-10-17 | End: 2022-10-17 | Stop reason: HOSPADM

## 2022-10-17 RX ORDER — SODIUM CHLORIDE, SODIUM LACTATE, POTASSIUM CHLORIDE, CALCIUM CHLORIDE 600; 310; 30; 20 MG/100ML; MG/100ML; MG/100ML; MG/100ML
INJECTION, SOLUTION INTRAVENOUS CONTINUOUS
Status: DISCONTINUED | OUTPATIENT
Start: 2022-10-17 | End: 2022-10-17 | Stop reason: HOSPADM

## 2022-10-17 RX ORDER — MORPHINE SULFATE 4 MG/ML
2 INJECTION, SOLUTION INTRAMUSCULAR; INTRAVENOUS EVERY 10 MIN PRN
Status: DISCONTINUED | OUTPATIENT
Start: 2022-10-17 | End: 2022-10-17 | Stop reason: HOSPADM

## 2022-10-17 RX ORDER — MIDAZOLAM HYDROCHLORIDE 1 MG/ML
INJECTION INTRAMUSCULAR; INTRAVENOUS AS NEEDED
Status: DISCONTINUED | OUTPATIENT
Start: 2022-10-17 | End: 2022-10-17 | Stop reason: SURG

## 2022-10-17 RX ORDER — HYDROMORPHONE HYDROCHLORIDE 1 MG/ML
0.4 INJECTION, SOLUTION INTRAMUSCULAR; INTRAVENOUS; SUBCUTANEOUS EVERY 5 MIN PRN
Status: DISCONTINUED | OUTPATIENT
Start: 2022-10-17 | End: 2022-10-17 | Stop reason: HOSPADM

## 2022-10-17 RX ORDER — OXYCODONE HYDROCHLORIDE 5 MG/1
5 TABLET ORAL EVERY 4 HOURS PRN
Status: DISCONTINUED | OUTPATIENT
Start: 2022-10-17 | End: 2022-10-20

## 2022-10-17 RX ORDER — DEXAMETHASONE SODIUM PHOSPHATE 4 MG/ML
VIAL (ML) INJECTION AS NEEDED
Status: DISCONTINUED | OUTPATIENT
Start: 2022-10-17 | End: 2022-10-17 | Stop reason: SURG

## 2022-10-17 RX ADMIN — MIDAZOLAM HYDROCHLORIDE 2 MG: 1 INJECTION INTRAMUSCULAR; INTRAVENOUS at 13:50:00

## 2022-10-17 RX ADMIN — LABETALOL HYDROCHLORIDE 5 MG: 5 INJECTION, SOLUTION INTRAVENOUS at 15:00:00

## 2022-10-17 RX ADMIN — DEXAMETHASONE SODIUM PHOSPHATE 4 MG: 4 MG/ML VIAL (ML) INJECTION at 14:12:00

## 2022-10-17 RX ADMIN — SODIUM CHLORIDE: 9 INJECTION, SOLUTION INTRAVENOUS at 14:05:00

## 2022-10-17 RX ADMIN — INDOCYANINE GREEN AND WATER 5 MG: 25 MG KIT INJECTION at 15:41:00

## 2022-10-17 RX ADMIN — LIDOCAINE HYDROCHLORIDE ANHYDROUS AND DEXTROSE MONOHYDRATE 1.5 MG/MIN: .8; 5 INJECTION, SOLUTION INTRAVENOUS at 14:15:00

## 2022-10-17 RX ADMIN — ROCURONIUM BROMIDE 50 MG: 10 INJECTION, SOLUTION INTRAVENOUS at 13:55:00

## 2022-10-17 RX ADMIN — SODIUM CHLORIDE, SODIUM LACTATE, POTASSIUM CHLORIDE, CALCIUM CHLORIDE: 600; 310; 30; 20 INJECTION, SOLUTION INTRAVENOUS at 13:51:00

## 2022-10-17 RX ADMIN — LIDOCAINE HYDROCHLORIDE 50 MG: 10 INJECTION, SOLUTION EPIDURAL; INFILTRATION; INTRACAUDAL; PERINEURAL at 13:54:00

## 2022-10-17 RX ADMIN — METRONIDAZOLE 500 MG: 500 INJECTION, SOLUTION INTRAVENOUS at 14:15:00

## 2022-10-17 NOTE — ANESTHESIA POSTPROCEDURE EVALUATION
Patient: Tharon Stapler Picchietti    Procedure Summary     Date: 10/17/22 Room / Location: [facility] OR [address] [place] [address] OR    Anesthesia Start: 4693 Anesthesia Stop: 9055    Procedures:       XI robot-assisted, laparoscopic, left hemicolectomy (Anjali),Colonoscopy, tattooing () (N/A )      COLONOSCOPY WITH BIOPSY Diagnosis:       Adenocarcinoma of sigmoid colon ([address]      (Adenocarcinoma of sigmoid colon ([address] [C18.7])    Surgeons: Magdalena Lawrence MD; Flory Zarate MD Anesthesiologist: Anne Paz MD    Anesthesia Type: general ASA Status: 3          Anesthesia Type: general    Vitals Value Taken Time   /90 10/17/22 1712   Temp  10/17/22 1713   Pulse 91 10/17/22 1713   Resp 20 10/17/22 1713   SpO2 94 % 10/17/22 1713   Vitals shown include unvalidated device data.     [facility] Post Evaluation:   Patient Evaluated in PACU  Patient Participation: complete - patient participated  Level of Consciousness: awake  Pain Management: adequate  Airway Patency:patent  Yes    Cardiovascular Status: acceptable  Respiratory Status: acceptable  Postoperative Hydration acceptable      See Collier MD  10/17/2022 5:13 PM

## 2022-10-17 NOTE — INTERVAL H&P NOTE
Patient seen and examined. No changes to the attached history and physical are noted. 50year old male presenting today for planned robotic assisted left hemicolectomy. Mansoor Perez PA-C  Pager: [phone]    Department of Surgical Oncology  [facility] [address], [address] [facility] AND [facility]  [address] [place] [address], [address] [place] [address]  T: [phone]  F: [phone]

## 2022-10-17 NOTE — ANESTHESIA PROCEDURE NOTES
Airway  Date/Time: 10/17/2022 1:57 PM  Urgency: Elective    Airway not difficult    General Information and Staff    Patient location during procedure: OR  Anesthesiologist: Wilfrido Tompkins MD  Resident/CRNA: Jose Moore CRNA  Performed: CRNA     Indications and Patient Condition  Indications for airway management: anesthesia  Spontaneous Ventilation: absent  Sedation level: deep  Preoxygenated: yes  Patient position: sniffing  Mask difficulty assessment: 2 - vent by mask + OA or adjuvant +/- NMBA    Final Airway Details  Final airway type: endotracheal airway      Successful airway: ETT  Cuffed: yes   Successful intubation technique: direct laryngoscopy  Facilitating devices/methods: intubating stylet  Endotracheal tube insertion site: oral  Blade: Korin  Blade size: #3  ETT size (mm): 8.0    Cormack-Lehane Classification: grade IIB - view of arytenoids or posterior of glottis only  Placement verified by: chest auscultation and capnometry   Cuff volume (mL): 6  Measured from: teeth  ETT to teeth (cm): 21  Number of attempts at approach: 1  Number of other approaches attempted: 0

## 2022-10-17 NOTE — BRIEF OP NOTE
Pre-Operative Diagnosis: Adenocarcinoma of sigmoid colon ([facility] [C18.7]     Post-Operative Diagnosis: Adenocarcinoma of sigmoid colon ([facility] [C18.7]      Procedure Performed:   XI robot-assisted, laparoscopic, left hemicolectomy    Surgeon(s) and Role:  Panel 1:     * John Garcia MD - Primary    Assistant(s):   Kristy Matias PA-C     Surgical Findings: pending pathology     Specimen: portion of left colon     Estimated Blood Loss: 20cc    Dictation Number:  n/a    Kristy Matias PA-C  10/17/2022  4:31 PM

## 2022-10-18 LAB
ANION GAP SERPL CALC-SCNC: 10 MMOL/L (ref 0–18)
BUN BLD-MCNC: 9 MG/DL (ref 7–18)
BUN/CREAT SERPL: 9 (ref 10–20)
CALCIUM BLD-MCNC: 8.6 MG/DL (ref 8.5–10.1)
CHLORIDE SERPL-SCNC: 104 MMOL/L (ref 98–112)
CO2 SERPL-SCNC: 22 MMOL/L (ref 21–32)
CREAT BLD-MCNC: 1 MG/DL
DEPRECATED RDW RBC AUTO: 39.8 FL (ref 35.1–46.3)
ERYTHROCYTE [DISTWIDTH] IN BLOOD BY AUTOMATED COUNT: 11.9 % (ref 11–15)
GFR SERPLBLD BASED ON 1.73 SQ M-ARVRAT: 92 ML/MIN/1.73M2 (ref 60–?)
GLUCOSE BLD-MCNC: 117 MG/DL (ref 70–99)
HCT VFR BLD AUTO: 42.1 %
HGB BLD-MCNC: 14.8 G/DL
MAGNESIUM SERPL-MCNC: 1.8 MG/DL (ref 1.6–2.6)
MCH RBC QN AUTO: 32.1 PG (ref 26–34)
MCHC RBC AUTO-ENTMCNC: 35.2 G/DL (ref 31–37)
MCV RBC AUTO: 91.3 FL
OSMOLALITY SERPL CALC.SUM OF ELEC: 282 MOSM/KG (ref 275–295)
PHOSPHATE SERPL-MCNC: 2.6 MG/DL (ref 2.5–4.9)
PLATELET # BLD AUTO: 237 10(3)UL (ref 150–450)
POTASSIUM SERPL-SCNC: 3.6 MMOL/L (ref 3.5–5.1)
RBC # BLD AUTO: 4.61 X10(6)UL
SODIUM SERPL-SCNC: 136 MMOL/L (ref 136–145)
WBC # BLD AUTO: 15.4 X10(3) UL (ref 4–11)

## 2022-10-18 PROCEDURE — 99233 SBSQ HOSP IP/OBS HIGH 50: CPT | Performed by: HOSPITALIST

## 2022-10-18 RX ORDER — CALCIUM CARBONATE 200(500)MG
1000 TABLET,CHEWABLE ORAL EVERY 6 HOURS PRN
Status: DISCONTINUED | OUTPATIENT
Start: 2022-10-18 | End: 2022-10-20

## 2022-10-18 RX ORDER — HYDRALAZINE HYDROCHLORIDE 20 MG/ML
20 INJECTION INTRAMUSCULAR; INTRAVENOUS EVERY 6 HOURS PRN
Status: DISCONTINUED | OUTPATIENT
Start: 2022-10-18 | End: 2022-10-20

## 2022-10-18 RX ORDER — MAGNESIUM OXIDE 400 MG/1
400 TABLET ORAL ONCE
Status: COMPLETED | OUTPATIENT
Start: 2022-10-18 | End: 2022-10-18

## 2022-10-18 RX ORDER — POTASSIUM CHLORIDE 20 MEQ/1
40 TABLET, EXTENDED RELEASE ORAL EVERY 4 HOURS
Status: DISPENSED | OUTPATIENT
Start: 2022-10-18 | End: 2022-10-18

## 2022-10-18 NOTE — PROGRESS NOTES
Admission from ED s/p left hemicolectomy. Patient is A&O x4. Prn oxy and dilaudid for pain control. Tolerating CLD; LF/S this AM and adv. as tolerated per order. Brito in place. Lap sites x6 with old drainage. Remote tele in place. IV fluids infusing per order. Patient ambulating in room and sitting in chair this AM. Plan of care discussed and all questions answered. Safety precautions in place. Call light and belongings at bedside and within reach. Frequent monitoring.

## 2022-10-18 NOTE — PLAN OF CARE
A/O x 4. Nausea and small amount of clear emesis this morning, zofran given with good results. Brito removed this morning per order, pt voiding, encouraging PO fluids. Had a small bowel movement this evening but denies passing flatus. Walking in hallways. Pain managed with scheduled tylenol. Abdominal incisions with surgical dressings in place, old drainage, intact. Bed in lowest position, call light in reach, frequent rounding, nonskid footwear, fall precautions in place.

## 2022-10-18 NOTE — CM/SW NOTE
SW received MDO for DC planning. Per RN rounds, pt will DC home with his spouse. Per RN - no SW needs at DC. SW to defer consult at this time.      Yovani GARZA, [place], [place]   Ext [phone]

## 2022-10-19 LAB
ANION GAP SERPL CALC-SCNC: 3 MMOL/L (ref 0–18)
BUN BLD-MCNC: 9 MG/DL (ref 7–18)
BUN/CREAT SERPL: 9.4 (ref 10–20)
CALCIUM BLD-MCNC: 8.5 MG/DL (ref 8.5–10.1)
CHLORIDE SERPL-SCNC: 104 MMOL/L (ref 98–112)
CO2 SERPL-SCNC: 27 MMOL/L (ref 21–32)
CREAT BLD-MCNC: 0.96 MG/DL
DEPRECATED RDW RBC AUTO: 39 FL (ref 35.1–46.3)
ERYTHROCYTE [DISTWIDTH] IN BLOOD BY AUTOMATED COUNT: 11.9 % (ref 11–15)
GFR SERPLBLD BASED ON 1.73 SQ M-ARVRAT: 96 ML/MIN/1.73M2 (ref 60–?)
GLUCOSE BLD-MCNC: 107 MG/DL (ref 70–99)
HCT VFR BLD AUTO: 40.3 %
HGB BLD-MCNC: 14.5 G/DL
MAGNESIUM SERPL-MCNC: 2 MG/DL (ref 1.6–2.6)
MCH RBC QN AUTO: 32.3 PG (ref 26–34)
MCHC RBC AUTO-ENTMCNC: 36 G/DL (ref 31–37)
MCV RBC AUTO: 89.8 FL
OSMOLALITY SERPL CALC.SUM OF ELEC: 277 MOSM/KG (ref 275–295)
PHOSPHATE SERPL-MCNC: 3 MG/DL (ref 2.5–4.9)
PLATELET # BLD AUTO: 209 10(3)UL (ref 150–450)
POTASSIUM SERPL-SCNC: 4 MMOL/L (ref 3.5–5.1)
POTASSIUM SERPL-SCNC: 4 MMOL/L (ref 3.5–5.1)
RBC # BLD AUTO: 4.49 X10(6)UL
SODIUM SERPL-SCNC: 134 MMOL/L (ref 136–145)
WBC # BLD AUTO: 13.4 X10(3) UL (ref 4–11)

## 2022-10-19 PROCEDURE — 99233 SBSQ HOSP IP/OBS HIGH 50: CPT | Performed by: INTERNAL MEDICINE

## 2022-10-19 RX ORDER — HYDROXYCHLOROQUINE SULFATE 200 MG/1
200 TABLET, FILM COATED ORAL 2 TIMES DAILY
Status: DISCONTINUED | OUTPATIENT
Start: 2022-10-19 | End: 2022-10-20

## 2022-10-19 NOTE — PLAN OF CARE
A/O x 4. Tolerating low fiber diet but poor appetite. Reports passing flatus yesterday but no flatus today. BM yesterday. Voiding WNL. Walking in hallways frequently. Pain managed with scheduled tylenol. Pt reporting heartburn, tums given with good results. Incisions to abdomen clean/dry/intact, dressing removed this morning by PA. Remote telemetry in place, NSR - NST with exertion. Bed in lowest position, call light in reach, frequent rounding, nonskid footwear, fall precautions in place. Likely to discharge home tomorrow.

## 2022-10-19 NOTE — PLAN OF CARE
No acute medical changes during the shift. Patient is A&O x4. Prn oxy and scheduled tylenol for pain control. LF/S diet with poor appetite. Encouraged PO intake as tolerated. Lap sites x6 with old drainage. Remote tele in place. Patient ambulating in the hallway. Elevated BP; MD aware and ordered prn hydralazine. Plan of care discussed and all questions answered. Safety precautions in place. Call light and belongings at bedside and within reach. Frequent monitoring.

## 2022-10-19 NOTE — OPERATIVE REPORT
Date of operation 10/17/2022    Preoperative diagnosis[de-identified]  1.  History of pedunculated tubular adenoma with infiltrating moderately to poorly differentiated adenocarcinoma of the left colon    Operations performed:  1.  Robotic assisted left hemicolectomy with primary end-to-end handsewn anastomosis  2. Takedown of splenic flexure    Postoperative diagnosis:  1. Same    Operating Surgeon: Concetta Sosa MD    Assistant:  1. GIBRAN Rowell    Indications: This is a very pleasant 33-year-old gentleman who was diagnosed with a moderate to poorly differentiated adenocarcinoma arising within a pedunculated tubular adenoma. The tumor exhibited worrisome features including poorly differentiated histology as well as lymphovascular invasion. The case was discussed in our multidisciplinary tumor board. Decision was made to offer colectomy for definitive management. Patient presents today for the the above reason. Details of the operation:  The patient was brought to the operating room and laid supine on the operating table. General endotracheal anesthesia was induced without complications. All pressure points were padded appropriately. Prior to the procedure, Dr. Dae Case proceeded with a lower endoscopy and tattooing of the previous biopsy area. The patient was then positioned into lithotomy on the operating table. The abdomen was prepped and draped in the standard sterile manner. A timeout was completed verifying the patient's name, procedure to be performed and demonstration of antibiotics. Everybody in the room was in agreement. At Saez's point, an incision was made to accommodate the Veress needle. Pneumoperitoneum was established. Additional working ports were placed in a straight line from the left upper quadrant towards the right lower quadrant. The right lower quadrant port was exchanged to a 12 and a right upper quadrant 5 mm port was placed for an assistant.   The patient was then repositioned with the left side up. The robot was docked per usual.  Attention was directed towards the left colon. The sigmoid was mobilized along the line of Toldt and the dissection was carried cephalad towards the splenic flexure. Omental attachments were then taken off of the splenic flexure and attachments to the retroperitoneum at that level were taken down with the vessel sealer. This took down the entire splenic flexure to allow for a tension-free anastomosis later. Additionally, the left colon was medialized further to allow for resection and a comfortable tension-free anastomosis. After completing mobilization, antimesenteric windows were created 5 cm proximally and distally from the tattoo. A blue load stapler was used to divide the bowel. The mesentery was taken with the vessel sealer. Then, an end-to-end handsewn anastomosis was then fashioned per usual using 1 layer of 2 oh V-Loc absorbable suture. Hemostasis was excellent. Robot was undocked. The specimen was then extracted through a small midline limited incision. The fascia was closed using 0 Vicryl running suture. Remainder of ports were withdrawn under direct vision. 4-0 Vicryl running subcuticular was used to approximate the skin. A total of 20 cc of 1% lidocaine intermixed with 0.5% Marcaine with epinephrine were infiltrated. Steri-Strips and sterile dressings were applied. The patient was awakened and taken to the postoperative anesthesia care and in a stable state. I was present for the entire case. Susan Young MD  [facility] On[facility]  [place] [address]  Pager [phone]  [email or web address]

## 2022-10-20 VITALS
DIASTOLIC BLOOD PRESSURE: 80 MMHG | SYSTOLIC BLOOD PRESSURE: 135 MMHG | HEIGHT: 68 IN | WEIGHT: 180 LBS | RESPIRATION RATE: 18 BRPM | HEART RATE: 82 BPM | TEMPERATURE: 99 F | BODY MASS INDEX: 27.28 KG/M2 | OXYGEN SATURATION: 95 %

## 2022-10-20 LAB
ANION GAP SERPL CALC-SCNC: 9 MMOL/L (ref 0–18)
BUN BLD-MCNC: 12 MG/DL (ref 7–18)
BUN/CREAT SERPL: 13.3 (ref 10–20)
CALCIUM BLD-MCNC: 8.8 MG/DL (ref 8.5–10.1)
CHLORIDE SERPL-SCNC: 102 MMOL/L (ref 98–112)
CO2 SERPL-SCNC: 23 MMOL/L (ref 21–32)
CREAT BLD-MCNC: 0.9 MG/DL
DEPRECATED RDW RBC AUTO: 40.1 FL (ref 35.1–46.3)
ERYTHROCYTE [DISTWIDTH] IN BLOOD BY AUTOMATED COUNT: 11.8 % (ref 11–15)
GFR SERPLBLD BASED ON 1.73 SQ M-ARVRAT: 104 ML/MIN/1.73M2 (ref 60–?)
GLUCOSE BLD-MCNC: 98 MG/DL (ref 70–99)
HCT VFR BLD AUTO: 42.2 %
HGB BLD-MCNC: 14.5 G/DL
MAGNESIUM SERPL-MCNC: 2 MG/DL (ref 1.6–2.6)
MCH RBC QN AUTO: 31.9 PG (ref 26–34)
MCHC RBC AUTO-ENTMCNC: 34.4 G/DL (ref 31–37)
MCV RBC AUTO: 92.7 FL
OSMOLALITY SERPL CALC.SUM OF ELEC: 278 MOSM/KG (ref 275–295)
PHOSPHATE SERPL-MCNC: 3.1 MG/DL (ref 2.5–4.9)
PLATELET # BLD AUTO: 229 10(3)UL (ref 150–450)
POTASSIUM SERPL-SCNC: 3.8 MMOL/L (ref 3.5–5.1)
RBC # BLD AUTO: 4.55 X10(6)UL
SODIUM SERPL-SCNC: 134 MMOL/L (ref 136–145)
WBC # BLD AUTO: 13.1 X10(3) UL (ref 4–11)

## 2022-10-20 PROCEDURE — 99239 HOSP IP/OBS DSCHRG MGMT >30: CPT | Performed by: INTERNAL MEDICINE

## 2022-10-20 PROCEDURE — 45335 SIGMOIDOSCOPY W/SUBMUC INJ: CPT | Performed by: STUDENT IN AN ORGANIZED HEALTH CARE EDUCATION/TRAINING PROGRAM

## 2022-10-20 RX ORDER — METOPROLOL TARTRATE 50 MG/1
50 TABLET, FILM COATED ORAL
Qty: 60 TABLET | Refills: 0 | Status: SHIPPED | OUTPATIENT
Start: 2022-10-20 | End: 2022-10-28 | Stop reason: ALTCHOICE

## 2022-10-20 RX ORDER — ONDANSETRON 4 MG/1
4 TABLET, ORALLY DISINTEGRATING ORAL EVERY 4 HOURS PRN
Qty: 10 TABLET | Refills: 0 | Status: SHIPPED | OUTPATIENT
Start: 2022-10-20 | End: 2022-10-28 | Stop reason: ALTCHOICE

## 2022-10-20 RX ORDER — METOPROLOL TARTRATE 50 MG/1
50 TABLET, FILM COATED ORAL
Status: DISCONTINUED | OUTPATIENT
Start: 2022-10-20 | End: 2022-10-20

## 2022-10-20 RX ORDER — TRAMADOL HYDROCHLORIDE 50 MG/1
50 TABLET ORAL EVERY 4 HOURS PRN
Qty: 16 TABLET | Refills: 0 | Status: SHIPPED | OUTPATIENT
Start: 2022-10-20 | End: 2022-10-28 | Stop reason: ALTCHOICE

## 2022-10-20 NOTE — PROGRESS NOTES
Madeline Gsatelum did well today. Vitals remained stable. Tolerating diet, denies any nausea or vomiting. Pain being controlled with Tylenol. Will discharge home. Discussed plan with the patient and discharge instructions.

## 2022-10-20 NOTE — PLAN OF CARE
Problem: Patient Centered Care  Goal: Patient preferences are identified and integrated in the patient's plan of care  Description: Interventions:  - What would you like us to know as we care for you?   - Provide timely, complete, and accurate information to patient/family  - Incorporate patient and family knowledge, values, beliefs, and cultural backgrounds into the planning and delivery of care  - Encourage patient/family to participate in care and decision-making at the level they choose  - Honor patient and family perspectives and choices  Outcome: Progressing     Problem: Patient/Family Goals  Goal: Patient/Family Long Term Goal  Description: Patient's Long Term Goal:     Interventions:  -   - See additional Care Plan goals for specific interventions  Outcome: Progressing  Goal: Patient/Family Short Term Goal  Description: Patient's Short Term Goal:     Interventions:   -   - See additional Care Plan goals for specific interventions  Outcome: Moe Richard is aware of his plan of care. Patient is alert and oriented x4, room air. Pain controlled with sched Tylenol. Patient had episode of dry heaving, PRN Zofran given with great relief. Poor appetite, only tolerating small bites of food. Surgical sites, clean, dry and intact. Voids WNL. X3 BMs overnight, no blood noted. Patient had run of PSVT per tele, MD notified, Lopressor started. Safety measures in place, call light within reach, will continue to monitor.

## 2022-10-21 ENCOUNTER — PATIENT OUTREACH (OUTPATIENT)
Dept: CASE MANAGEMENT | Age: 52
End: 2022-10-21

## 2022-10-21 ENCOUNTER — TELEPHONE (OUTPATIENT)
Dept: SURGERY | Facility: CLINIC | Age: 52
End: 2022-10-21

## 2022-10-21 DIAGNOSIS — Z02.9 ENCOUNTERS FOR UNSPECIFIED ADMINISTRATIVE PURPOSE: ICD-10-CM

## 2022-10-21 DIAGNOSIS — C18.7 ADENOCARCINOMA OF SIGMOID COLON (HCC): ICD-10-CM

## 2022-10-21 PROCEDURE — 1111F DSCHRG MED/CURRENT MED MERGE: CPT

## 2022-10-21 NOTE — TELEPHONE ENCOUNTER
Contacted pt for condition check and to schedule post op. Pt doing very well. No fevers or chills, no issues with surgical sites. Pain controlled with Tylenol. Post op appt scheduled for 11/2 at 11am. Pt knows to call with any questions or concerns.

## 2022-10-22 ENCOUNTER — TELEPHONE (OUTPATIENT)
Facility: CLINIC | Age: 52
End: 2022-10-22

## 2022-10-22 NOTE — TELEPHONE ENCOUNTER
RN to change colon call back to 1 year from prior colonoscopy - so set up for September 2023. We did have a 6 month recall on but he had surgery so will change to the 1 year follow up as per guidelines.

## 2022-10-24 NOTE — PROCEDURES
COLONOSCOPY REPORT    Fidencio Moreno     1970 Age 50year old   PCP Charol Merlin, MD Endoscopist Mick Cabrera MD       Colonoscopy performed to the sigmoid colon. There was a hemoclip visualized from recent polyp resection. The intention of this colonoscopy was to located clip/scar from recent polyp resection and tattoo the area. The hemoclip was visualized, a total of 4 ccs of viarl ink was injected in to regions for marking purposes. The colonoscope was then withdrawn.     Mick Cabrera MD

## 2022-10-24 NOTE — TELEPHONE ENCOUNTER
Please see telephone encounter from 9/16/2022. I updated the recall in patient outreach in that encounter.

## 2022-10-28 ENCOUNTER — OFFICE VISIT (OUTPATIENT)
Dept: FAMILY MEDICINE CLINIC | Facility: CLINIC | Age: 52
End: 2022-10-28
Payer: COMMERCIAL

## 2022-10-28 VITALS
BODY MASS INDEX: 26.37 KG/M2 | HEIGHT: 68 IN | WEIGHT: 174 LBS | RESPIRATION RATE: 19 BRPM | SYSTOLIC BLOOD PRESSURE: 120 MMHG | HEART RATE: 78 BPM | DIASTOLIC BLOOD PRESSURE: 74 MMHG | OXYGEN SATURATION: 99 %

## 2022-10-28 DIAGNOSIS — Z09 HOSPITAL DISCHARGE FOLLOW-UP: Primary | ICD-10-CM

## 2022-10-28 DIAGNOSIS — C18.9 ADENOCARCINOMA, COLON (HCC): ICD-10-CM

## 2022-10-28 DIAGNOSIS — Z90.49 S/P LEFT HEMICOLECTOMY: ICD-10-CM

## 2022-10-28 PROCEDURE — 3008F BODY MASS INDEX DOCD: CPT | Performed by: FAMILY MEDICINE

## 2022-10-28 PROCEDURE — 3074F SYST BP LT 130 MM HG: CPT | Performed by: FAMILY MEDICINE

## 2022-10-28 PROCEDURE — 3078F DIAST BP <80 MM HG: CPT | Performed by: FAMILY MEDICINE

## 2022-10-28 PROCEDURE — 99495 TRANSJ CARE MGMT MOD F2F 14D: CPT | Performed by: FAMILY MEDICINE

## 2022-11-02 ENCOUNTER — OFFICE VISIT (OUTPATIENT)
Dept: SURGERY | Facility: CLINIC | Age: 52
End: 2022-11-02
Payer: COMMERCIAL

## 2022-11-02 VITALS
HEART RATE: 66 BPM | OXYGEN SATURATION: 98 % | WEIGHT: 173 LBS | HEIGHT: 68 IN | RESPIRATION RATE: 16 BRPM | SYSTOLIC BLOOD PRESSURE: 113 MMHG | BODY MASS INDEX: 26.22 KG/M2 | DIASTOLIC BLOOD PRESSURE: 73 MMHG

## 2022-11-02 DIAGNOSIS — C18.7 ADENOCARCINOMA OF SIGMOID COLON (HCC): Primary | ICD-10-CM

## 2022-11-02 PROCEDURE — 99024 POSTOP FOLLOW-UP VISIT: CPT | Performed by: SURGERY

## 2022-11-02 PROCEDURE — 3078F DIAST BP <80 MM HG: CPT | Performed by: SURGERY

## 2022-11-02 PROCEDURE — 3074F SYST BP LT 130 MM HG: CPT | Performed by: SURGERY

## 2022-11-02 PROCEDURE — 3008F BODY MASS INDEX DOCD: CPT | Performed by: SURGERY

## 2023-02-13 ENCOUNTER — LAB ENCOUNTER (OUTPATIENT)
Dept: LAB | Age: 53
End: 2023-02-13
Attending: FAMILY MEDICINE
Payer: COMMERCIAL

## 2023-02-13 ENCOUNTER — OFFICE VISIT (OUTPATIENT)
Dept: FAMILY MEDICINE CLINIC | Facility: CLINIC | Age: 53
End: 2023-02-13

## 2023-02-13 VITALS
DIASTOLIC BLOOD PRESSURE: 80 MMHG | HEIGHT: 68 IN | HEART RATE: 70 BPM | OXYGEN SATURATION: 98 % | SYSTOLIC BLOOD PRESSURE: 117 MMHG | WEIGHT: 178 LBS | RESPIRATION RATE: 19 BRPM | BODY MASS INDEX: 26.98 KG/M2

## 2023-02-13 DIAGNOSIS — Z00.00 ANNUAL PHYSICAL EXAM: ICD-10-CM

## 2023-02-13 DIAGNOSIS — Z00.00 ANNUAL PHYSICAL EXAM: Primary | ICD-10-CM

## 2023-02-13 LAB
ALBUMIN SERPL-MCNC: 3.8 G/DL (ref 3.4–5)
ALBUMIN/GLOB SERPL: 1.1 {RATIO} (ref 1–2)
ALP LIVER SERPL-CCNC: 111 U/L
ALT SERPL-CCNC: 56 U/L
ANION GAP SERPL CALC-SCNC: 7 MMOL/L (ref 0–18)
AST SERPL-CCNC: 28 U/L (ref 15–37)
BILIRUB SERPL-MCNC: 0.6 MG/DL (ref 0.1–2)
BUN BLD-MCNC: 10 MG/DL (ref 7–18)
BUN/CREAT SERPL: 8.6 (ref 10–20)
CALCIUM BLD-MCNC: 9.4 MG/DL (ref 8.5–10.1)
CHLORIDE SERPL-SCNC: 109 MMOL/L (ref 98–112)
CHOLEST SERPL-MCNC: 170 MG/DL (ref ?–200)
CO2 SERPL-SCNC: 26 MMOL/L (ref 21–32)
CREAT BLD-MCNC: 1.16 MG/DL
FASTING PATIENT LIPID ANSWER: NO
FASTING STATUS PATIENT QL REPORTED: NO
GFR SERPLBLD BASED ON 1.73 SQ M-ARVRAT: 77 ML/MIN/1.73M2 (ref 60–?)
GLOBULIN PLAS-MCNC: 3.6 G/DL (ref 2.8–4.4)
GLUCOSE BLD-MCNC: 127 MG/DL (ref 70–99)
HDLC SERPL-MCNC: 53 MG/DL (ref 40–59)
LDLC SERPL CALC-MCNC: 101 MG/DL (ref ?–100)
NONHDLC SERPL-MCNC: 117 MG/DL (ref ?–130)
OSMOLALITY SERPL CALC.SUM OF ELEC: 295 MOSM/KG (ref 275–295)
POTASSIUM SERPL-SCNC: 4.1 MMOL/L (ref 3.5–5.1)
PROT SERPL-MCNC: 7.4 G/DL (ref 6.4–8.2)
SODIUM SERPL-SCNC: 142 MMOL/L (ref 136–145)
TRIGL SERPL-MCNC: 87 MG/DL (ref 30–149)
TSI SER-ACNC: 2.17 MIU/ML (ref 0.36–3.74)
VLDLC SERPL CALC-MCNC: 14 MG/DL (ref 0–30)

## 2023-02-13 PROCEDURE — 90471 IMMUNIZATION ADMIN: CPT | Performed by: FAMILY MEDICINE

## 2023-02-13 PROCEDURE — 80061 LIPID PANEL: CPT

## 2023-02-13 PROCEDURE — 36415 COLL VENOUS BLD VENIPUNCTURE: CPT

## 2023-02-13 PROCEDURE — 84443 ASSAY THYROID STIM HORMONE: CPT

## 2023-02-13 PROCEDURE — 99396 PREV VISIT EST AGE 40-64: CPT | Performed by: FAMILY MEDICINE

## 2023-02-13 PROCEDURE — 3079F DIAST BP 80-89 MM HG: CPT | Performed by: FAMILY MEDICINE

## 2023-02-13 PROCEDURE — 3074F SYST BP LT 130 MM HG: CPT | Performed by: FAMILY MEDICINE

## 2023-02-13 PROCEDURE — 90750 HZV VACC RECOMBINANT IM: CPT | Performed by: FAMILY MEDICINE

## 2023-02-13 PROCEDURE — 3008F BODY MASS INDEX DOCD: CPT | Performed by: FAMILY MEDICINE

## 2023-02-13 PROCEDURE — 80053 COMPREHEN METABOLIC PANEL: CPT

## 2023-02-13 RX ORDER — TAPINAROF 10 MG/1000MG
1 CREAM TOPICAL 2 TIMES DAILY PRN
COMMUNITY
Start: 2022-12-23

## 2023-02-21 ENCOUNTER — NURSE ONLY (OUTPATIENT)
Dept: FAMILY MEDICINE CLINIC | Facility: CLINIC | Age: 53
End: 2023-02-21

## 2023-02-21 DIAGNOSIS — Z23 NEED FOR PNEUMOCOCCAL VACCINE: Primary | ICD-10-CM

## 2023-02-21 PROCEDURE — 90471 IMMUNIZATION ADMIN: CPT | Performed by: FAMILY MEDICINE

## 2023-02-21 PROCEDURE — 90677 PCV20 VACCINE IM: CPT | Performed by: FAMILY MEDICINE

## 2023-06-29 ENCOUNTER — TELEPHONE (OUTPATIENT)
Facility: CLINIC | Age: 53
End: 2023-06-29

## 2023-06-29 NOTE — TELEPHONE ENCOUNTER
Patient outreach message received:    Recall updated in patient outreach in [facility]. Next due 9/2023. Recall reminder letter mailed out to patient.

## 2023-08-02 ENCOUNTER — OFFICE VISIT (OUTPATIENT)
Dept: RHEUMATOLOGY | Facility: CLINIC | Age: 53
End: 2023-08-02

## 2023-08-02 VITALS
RESPIRATION RATE: 16 BRPM | HEIGHT: 68 IN | HEART RATE: 74 BPM | WEIGHT: 176.81 LBS | DIASTOLIC BLOOD PRESSURE: 75 MMHG | BODY MASS INDEX: 26.8 KG/M2 | SYSTOLIC BLOOD PRESSURE: 118 MMHG

## 2023-08-02 DIAGNOSIS — M05.79 RHEUMATOID ARTHRITIS INVOLVING MULTIPLE SITES WITH POSITIVE RHEUMATOID FACTOR (HCC): Primary | ICD-10-CM

## 2023-08-02 DIAGNOSIS — R73.03 PREDIABETES: ICD-10-CM

## 2023-08-02 DIAGNOSIS — Z51.81 THERAPEUTIC DRUG MONITORING: ICD-10-CM

## 2023-08-02 DIAGNOSIS — E55.9 VITAMIN D DEFICIENCY: ICD-10-CM

## 2023-08-02 PROCEDURE — 3074F SYST BP LT 130 MM HG: CPT | Performed by: INTERNAL MEDICINE

## 2023-08-02 PROCEDURE — 3008F BODY MASS INDEX DOCD: CPT | Performed by: INTERNAL MEDICINE

## 2023-08-02 PROCEDURE — 3078F DIAST BP <80 MM HG: CPT | Performed by: INTERNAL MEDICINE

## 2023-08-02 PROCEDURE — 99214 OFFICE O/P EST MOD 30 MIN: CPT | Performed by: INTERNAL MEDICINE

## 2023-08-02 RX ORDER — HYDROXYCHLOROQUINE SULFATE 200 MG/1
200 TABLET, FILM COATED ORAL 2 TIMES DAILY
Qty: 180 TABLET | Refills: 3 | Status: SHIPPED | OUTPATIENT
Start: 2023-08-02

## 2023-08-02 NOTE — PATIENT INSTRUCTIONS
1. Cont. hydroxychlrouqine - increase to 400mg  twice a day   2.. Return to clinic in 12 months. 3. Check labs now   4.  Eye exam in 2/2024

## 2023-08-04 ENCOUNTER — TELEPHONE (OUTPATIENT)
Facility: CLINIC | Age: 53
End: 2023-08-04

## 2023-08-04 DIAGNOSIS — C18.7 ADENOCARCINOMA OF SIGMOID COLON (HCC): Primary | ICD-10-CM

## 2023-08-07 RX ORDER — SODIUM, POTASSIUM,MAG SULFATES 17.5-3.13G
SOLUTION, RECONSTITUTED, ORAL ORAL
Qty: 1 EACH | Refills: 0 | Status: SHIPPED | OUTPATIENT
Start: 2023-08-07

## 2023-08-07 NOTE — TELEPHONE ENCOUNTER
[facility] Endoscopy Report        Preoperative Diagnosis:  - colon cancer screening  - hematochezia        Postoperative Diagnosis:  - colon polyps x 2  - diverticulosis  - small internal hemorrhoids        Procedure:    Colonoscopy         Surgeon:  Will Montalvo M.D. Anesthesia:  MAC sedation     Technique:  After informed consent, the patient was placed in the left lateral recumbent position. Digital rectal examination revealed no palpable intraluminal abnormalities. An Olympus variable stiffness 190 series HD colonoscope was inserted into the rectum and advanced under direct vision by following the lumen to the cecum. The colon was examined upon withdrawal in the left lateral position. The procedure was well tolerated without immediate complication. Findings:  The preparation of the colon was good. The terminal ileum was examined for 4 cm and visually normal.  The ileocecal valve was well preserved. The visualized colonic mucosa from the cecum to the anal verge was normal with an intact vascular pattern. Colon polyps x2 removed as follows;  -Both polyps removed from the sigmoid, first polyp was semipedunculated approximately 8 mm in size removed by cold snare technique. One clip was placed across the mucosal defect. The second polyp in the segment was pedunculated approximately 2 cm in size with a broad stalk, this was removed by hot snare with placement of 2 clips across the residual stalk. Both specimens were retrieved and sent for analysis and both sites were inspected and found to be free of bleeding. Diverticulosis with scattered rare diverticula noted in the sigmoid however occasional diverticula was noted in the transverse colon as well. No evidence of diverticulitis. Small internal hemorrhoids noted on retroflexed view.      Estimated blood loss-insignificant  Specimens-colon polyps        Impression:  - colon polyps x 2  - diverticulosis  - small internal hemorrhoids     Recommendations:  - Post polypectomy instructions given  - Repeat colonoscopy in 3 years  - High fiber diet for diverticular disease  - Symptomatic treatment of hemorrhoids              Khanh Lang. Ishaan Harrison MD  9/14/2022  2:35 PM    Collected 9/14/2022  2:21 PM       Status: Edited Result - FINAL       Visible to patient: Yes (seen)       Dx: Colon cancer screening    1 Result Note        Component  Ref Range & Units      Case Report     Surgical Pathology                                Case: [id number]                                     Authorizing Provider:  Jessa Diaz MD       Collected:           09/14/2022 02:27 PM             Ordering Location:     [facility]   Received:            09/14/2022 02:38 PM             Pathologist:           Perla Black MD                                                             Specimens:   A) - Sigmoid colon, Polyp x 1                                                                          B) - Colon polyp, Large sigmoid polyp x 1                                                      Addendum 1        Immunohistochemistry Testing for Mismatch Repair Proteins (Block B1):  MLH1:   Intact nuclear expression. PMS2:    Intact nuclear expression. MSH2:   Intact nuclear expression. MSH6:   Intact nuclear expression. No loss of expression of the major mismatch repair proteins is noted. This indicates a low probability of microsatellite instability-high (MSI-H). This is typically seen in carcinomas that are the result of chromosomal rather than microsatellite instability and strongly argues against the presence of Clayton syndrome. However, nonsense mutations may occur in up to 5% of tumors, resulting in a non-functional protein that is still detectable by immunohistochemistry. Clinical correlation is advised. PCR testing can be performed to exclude microsatellite instability, if clinically indicated.      The immunohistochemical stains interpreted in this case demonstrated appropriate reactivity of the positive controls. These stains were performed on formalin-fixed, paraffin-embedded tissue sections with each antibody analysis being performed on a separate slide. Addendum electronically signed by Tony Diaz MD on 9/16/2022 at  8:52 AM     Final Diagnosis:        A. Sigmoid colon polyp; polypectomy:  Fragments of tubular adenoma (0.7 cm in maximum dimension in aggregate). No evidence of severe dysplasia/carcinoma in situ or infiltrating carcinoma identified. B.  Large sigmoid colon polyp; polypectomy:  Pedunculated tubular adenoma (2.2 cm in maximum dimension) demonstrating prominent infiltrating moderately-poorly differentiated adenocarcinoma (1.5 cm in maximum dimension). Tumor shows infiltration into the submucosal soft tissues and few foci of lymphovascular invasion near the leading edge of the tumor. All inked cauterized margins are free of adenomatous changes/epithelial dysplasia or malignancy with infiltrating tumor showing a closest approach of 1.0 mm focally. Comment:  For  purposes, a second pathologist has reviewed the case and concurs with the above diagnostic interpretations. 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 The results of this immunohistochemical analysis will be conveyed in a supplemental report. Case findings were discussed with Dr. Twyla Dias on 9/15/2022 at 4:10 p.m. Electronically signed by Tony Diaz MD on 9/15/2022 at  4:18 PM        Clinical Information      Z12.11 Colon Cancer Screening. Polyps, diverticulosis, hemorrhoids.      Gross Description      Specimen A is labeled \"Picchietti, sigmoid colon polyp x1\" received in formalin. The specimen consists of multiple fragments of pink-tan soft tissue measuring in aggregate 0.7 x 0.4 x 0.2 cm. Submitted in cassette A1. Specimen B is labeled \"Picchietti, large sigmoid colon polyp x1\" received in formalin. The specimen consists of a pedunculated polyp measuring 2.2 x 1.8 x 1.7 cm. The resection margin is inked black. The specimen is serially sectioned and submitted entirely in cassettes B1-B3.  (jq)      Molly Martinez M.D./tran       Interpretation     Malignant Abnormal      Electronically signed by Emperatriz Fregoso MD on 9/15/2022 at  [address] [place] [address],[address] [place] [address])                Specimen Collected: 09/14/22  2:21 PM Last Resulted: 09/16/22  8:52 AM

## 2023-08-07 NOTE — TELEPHONE ENCOUNTER
Monique    Patient called to schedule 1 year recall colonoscopy with Dr. Radha Calix    Please provide orders if ok to schedule directly. Thank you    Last Procedure, Date, MD:  Colonoscopy Dr. Radha Calix 9/14/2022  Last Diagnosis:  colon polyp x2, diverticulosis, internal hemorrhoids  Recalled (mth/yrs): 1 year (per 10/22/22 TE)  Sedation Used Previously:  MAC  Last Prep Used (if known):  Trilyte  Quality Of Prep (if known): good  Anticoagulants: no  Diuretics: no  Diabetic Med's (PO/Injectables): no  Weight loss Med's: no  Iron/Herbal/Multivitamin Supplements (RX/OTC): no  Marijuana/Vaping/CBD: no  Height & Weight: 5'8\" 176 lbs  BMI: 26.89  Hx of Cardiac/CVA Issues/(MI/Stroke): no  Devices Pacemaker/Defibrillator/Stents: no  Respiratory Issues/Oxygen Use/RAJIV/COPD: no  Issues w/ Anesthesia: no    Symptoms (Y/N): no  Symptoms Details: n/a    Special Comments/Notes: n/a    Please advise on orders and prep. Thank you!

## 2023-08-07 NOTE — TELEPHONE ENCOUNTER
Scheduled for:  Colonoscopy [id number], [facility]  Provider Name:    Date:  12/19/2023  Location: mac   Sedation:  nelm  Time:  11:15am (pt is aware to arrive at 10:15am    Prep:  suprep  Meds/Allergies Reconciled?:  yes    Diagnosis with codes:  hx of adenocarcinoma C18.7  Was patient informed to call insurance with codes (Y/N):  yes     Referral sent?:  Referral was sent at the time of electronic surgical scheduling. [facility] or [address] [place] [address] notified?:  I sent an electronic request to Endo Scheduling and received a confirmation today. Medication Orders:  none  Misc Orders:  none     Further instructions given by staff:  I discussed the prep instructions with the patient which he verbally understood and is aware that I will send the instructions today. via [employer]

## 2023-08-07 NOTE — TELEPHONE ENCOUNTER
Reason: hx of adenocarcinoma  Prep: suprep  Medication: can continue  Sedation: MAC    Patient is due for 1 year recall in October for cancer surveillance. Please try to get him in as close to that date as possible.      Jamaal Mendes PA-C

## 2023-08-29 LAB
BUN: 12 MG/DL (ref 7–25)
C-REACTIVE PROTEIN: 10.2 MG/L
CALCIUM: 9.9 MG/DL (ref 8.6–10.3)
CARBON DIOXIDE: 26 MMOL/L (ref 20–32)
CHLORIDE: 103 MMOL/L (ref 98–110)
CREATININE: 1.03 MG/DL (ref 0.7–1.3)
EGFR: 88 ML/MIN/1.73M2
GLUCOSE: 97 MG/DL (ref 65–99)
HEMATOCRIT: 42.4 % (ref 38.5–50)
HEMOGLOBIN A1C: 5 % OF TOTAL HGB
HEMOGLOBIN: 14.7 G/DL (ref 13.2–17.1)
MCH: 32.2 PG (ref 27–33)
MCHC: 34.7 G/DL (ref 32–36)
MCV: 93 FL (ref 80–100)
MPV: 10.3 FL (ref 7.5–12.5)
PLATELET COUNT: 238 THOUSAND/UL (ref 140–400)
POTASSIUM: 4.4 MMOL/L (ref 3.5–5.3)
RDW: 12.3 % (ref 11–15)
RED BLOOD CELL COUNT: 4.56 MILLION/UL (ref 4.2–5.8)
SED RATE BY MODIFIED$WESTERGREN: 11 MM/H
SODIUM: 138 MMOL/L (ref 135–146)
VITAMIN D, 25-OH, TOTAL: 35 NG/ML (ref 30–100)
WHITE BLOOD CELL COUNT: 9 THOUSAND/UL (ref 3.8–10.8)

## 2023-11-01 RX ORDER — HYDROXYCHLOROQUINE SULFATE 200 MG/1
200 TABLET, FILM COATED ORAL 2 TIMES DAILY
Qty: 180 TABLET | Refills: 3 | Status: SHIPPED | OUTPATIENT
Start: 2023-11-01

## 2023-11-01 NOTE — TELEPHONE ENCOUNTER
LOV: 8/2/23  Last Refilled:#180, 3rfs 8/2/23    Future Appointments   Date Time Provider Gil Dumont   12/19/2023 11:15 AM SAM, PROCEDURE [address] None     Summary:  1. Cont. hydroxychlrouqine - increase to 400mg  twice a day   2.. Return to clinic in 12 months. 3. Check labs now   4. Eye exam in 2/2024     Loni Brewer MD  8/2/2023   10:42 AM  - Reviewed [place]- [place]  through Epic      Please advise.

## 2023-12-06 ENCOUNTER — OFFICE VISIT (OUTPATIENT)
Dept: FAMILY MEDICINE CLINIC | Facility: CLINIC | Age: 53
End: 2023-12-06
Payer: COMMERCIAL

## 2023-12-06 VITALS
DIASTOLIC BLOOD PRESSURE: 79 MMHG | SYSTOLIC BLOOD PRESSURE: 126 MMHG | RESPIRATION RATE: 17 BRPM | OXYGEN SATURATION: 98 % | HEART RATE: 72 BPM | BODY MASS INDEX: 26.67 KG/M2 | HEIGHT: 68 IN | WEIGHT: 176 LBS

## 2023-12-06 DIAGNOSIS — M54.50 ACUTE LEFT-SIDED LOW BACK PAIN WITHOUT SCIATICA: Primary | ICD-10-CM

## 2023-12-06 PROCEDURE — 99213 OFFICE O/P EST LOW 20 MIN: CPT | Performed by: FAMILY MEDICINE

## 2023-12-06 PROCEDURE — 3078F DIAST BP <80 MM HG: CPT | Performed by: FAMILY MEDICINE

## 2023-12-06 PROCEDURE — 3074F SYST BP LT 130 MM HG: CPT | Performed by: FAMILY MEDICINE

## 2023-12-06 PROCEDURE — 3008F BODY MASS INDEX DOCD: CPT | Performed by: FAMILY MEDICINE

## 2023-12-06 RX ORDER — NAPROXEN 500 MG/1
500 TABLET ORAL 2 TIMES DAILY WITH MEALS
Qty: 30 TABLET | Refills: 0 | Status: SHIPPED | OUTPATIENT
Start: 2023-12-06

## 2023-12-06 RX ORDER — CYCLOBENZAPRINE HCL 10 MG
10 TABLET ORAL NIGHTLY
Qty: 15 TABLET | Refills: 0 | Status: SHIPPED | OUTPATIENT
Start: 2023-12-06

## 2023-12-06 NOTE — H&P
HPI:    Jazmyn Sal is a 48year old male presents to clinic with a 3-day history of left-sided lower back pain. Patient was bending over to put on a table cloth away when he got up experienced sudden onset sharp pain on the left side of his lower back. Also feels he was experiencing muscle spasms. Since then, patient reports an 8 out of 10 pain. Denies numbness/tingling of lower extremities. Feels pain is radiating upwards. Denies urinary/bowel incontinence, no pain with urination. Has been taking ibuprofen without much improvement. HISTORY:  Past Medical History:   Diagnosis Date    Rheumatoid arthritis [facility])       Past Surgical History:   Procedure Laterality Date    COLONOSCOPY N/A 9/14/2022    Procedure: COLONOSCOPY;  Surgeon: Edvin Cullen MD;  Location: [facility] ENDO    COLONOSCOPY      CYSTOSCOPY,INSERT URETERAL STENT      HERNIA SURGERY        Family History   Problem Relation Age of Onset    Glaucoma Neg     Macular degeneration Neg     Diabetes Neg       Social History:   Social History     Socioeconomic History    Marital status:    Tobacco Use    Smoking status: Never    Smokeless tobacco: Never   Vaping Use    Vaping Use: Never used   Substance and Sexual Activity    Alcohol use: Yes     Comment: 3 beers daily     Drug use: No        Medications (Active prior to today's visit):  Current Outpatient Medications   Medication Sig Dispense Refill    hydroxychloroquine 200 MG Oral Tab Take 1 tablet (200 mg total) by mouth 2 (two) times daily. 180 tablet 3    Na Sulfate-K Sulfate-Mg Sulf (SUPREP BOWEL PREP KIT) 17.5-3.13-1.6 GM/177ML Oral Solution Take prep as directed by gastro office. May substitute with Trilyte/generic equivalent if needed. (Patient not taking: Reported on 12/6/2023) 1 each 0    VTAMA 1 % External Cream Apply 1 Application. topically 2 (two) times daily as needed. (Patient not taking: Reported on 8/2/2023)         Allergies:   Allergies   Allergen Reactions Sulfa Antibiotics OTHER (SEE COMMENTS)     Vomiting         Depression Screening (PHQ-2/PHQ-9): Over the LAST 2 WEEKS                         ROS:   Review of Systems   All other systems reviewed and are negative. PHYSICAL EXAM:     Vitals:    12/06/23 1316   BP: 126/79   BP Location: Right arm   Patient Position: Sitting   Cuff Size: adult   Pulse: 72   Resp: 17   SpO2: 98%   Weight: 176 lb (79.8 kg)   Height: 5' 8\" (1.727 m)     Physical Exam  Constitutional:       General: He is not in acute distress. Cardiovascular:      Rate and Rhythm: Normal rate. Pulmonary:      Effort: Pulmonary effort is normal. No respiratory distress. Musculoskeletal:      Comments: Back -no swelling or deformity, positive tenderness over left lower back. Range of motion limited because of pain   Neurological:      Mental Status: He is alert. ASSESSMENT/PLAN:   (M54.50) Acute left-sided low back pain without sciatica  (primary encounter diagnosis)  Plan:   -Secondary to muscular strain. Advised heat/gentle stretching. Cyclobenzaprine to pharmacy, to take nightly. Patient warned medication may cause sedation. To take naproxen during the day with food, prescription pharmacy. Follow-up in 7 to 10 days if symptoms have not improved. Responsible party/patient verbalized understanding of information discussed. No barriers to learning observed. Orders This Visit:  Orders Placed This Encounter   Procedures    Fluzone Quadrivalent 6mo+ 0.5mL       Meds This Visit:  Requested Prescriptions      No prescriptions requested or ordered in this encounter       Imaging & Referrals:  INFLUENZA VACCINE, QUAD, PRESERVATIVE FREE, 0.5 ML       The 21st Century cures Act makes medical notes like these available to patients in the interest of transparency. However, be advised that this is a medical document. It is intended as peer to peer communication.   It is written in medical language and may contain abbreviations or verbiage that are unfamiliar. It may appear blunt or direct. Medical documents are intended to carry relevant information, facts as evident, and the clinical opinion of the practitioner. This note was created by [employer] voice recognition. Errors in content may be related to improper recognition by the system; efforts to review and correct have been done but errors may still exist. Please contact me with any questions.        12/6/2023  Ruth Ham MD

## 2023-12-19 ENCOUNTER — HOSPITAL ENCOUNTER (OUTPATIENT)
Age: 53
Setting detail: HOSPITAL OUTPATIENT SURGERY
Discharge: HOME | End: 2023-12-19
Attending: INTERNAL MEDICINE | Admitting: INTERNAL MEDICINE
Payer: COMMERCIAL

## 2023-12-19 ENCOUNTER — ANESTHESIA (OUTPATIENT)
Dept: ENDOSCOPY | Age: 53
End: 2023-12-19
Payer: COMMERCIAL

## 2023-12-19 ENCOUNTER — ANESTHESIA EVENT (OUTPATIENT)
Dept: ENDOSCOPY | Age: 53
End: 2023-12-19
Payer: COMMERCIAL

## 2023-12-19 VITALS
WEIGHT: 175 LBS | OXYGEN SATURATION: 99 % | DIASTOLIC BLOOD PRESSURE: 87 MMHG | HEIGHT: 68 IN | BODY MASS INDEX: 26.52 KG/M2 | RESPIRATION RATE: 17 BRPM | SYSTOLIC BLOOD PRESSURE: 123 MMHG | HEART RATE: 64 BPM

## 2023-12-19 DIAGNOSIS — C18.7 ADENOCARCINOMA OF SIGMOID COLON (HCC): ICD-10-CM

## 2023-12-19 PROCEDURE — 88305 TISSUE EXAM BY PATHOLOGIST: CPT | Performed by: INTERNAL MEDICINE

## 2023-12-19 PROCEDURE — 45385 COLONOSCOPY W/LESION REMOVAL: CPT | Performed by: INTERNAL MEDICINE

## 2023-12-19 PROCEDURE — 99070 SPECIAL SUPPLIES PHYS/QHP: CPT | Performed by: INTERNAL MEDICINE

## 2023-12-19 RX ORDER — SODIUM CHLORIDE 9 MG/ML
INJECTION, SOLUTION INTRAVENOUS CONTINUOUS PRN
Status: DISCONTINUED | OUTPATIENT
Start: 2023-12-19 | End: 2023-12-19 | Stop reason: SURG

## 2023-12-19 RX ORDER — ONDANSETRON 2 MG/ML
4 INJECTION INTRAMUSCULAR; INTRAVENOUS ONCE AS NEEDED
Status: DISCONTINUED | OUTPATIENT
Start: 2023-12-19 | End: 2023-12-19

## 2023-12-19 RX ORDER — SODIUM CHLORIDE, SODIUM LACTATE, POTASSIUM CHLORIDE, CALCIUM CHLORIDE 600; 310; 30; 20 MG/100ML; MG/100ML; MG/100ML; MG/100ML
INJECTION, SOLUTION INTRAVENOUS CONTINUOUS
Status: DISCONTINUED | OUTPATIENT
Start: 2023-12-19 | End: 2023-12-19

## 2023-12-19 RX ORDER — NALOXONE HYDROCHLORIDE 0.4 MG/ML
0.08 INJECTION, SOLUTION INTRAMUSCULAR; INTRAVENOUS; SUBCUTANEOUS ONCE AS NEEDED
Status: DISCONTINUED | OUTPATIENT
Start: 2023-12-19 | End: 2023-12-19

## 2023-12-19 RX ADMIN — SODIUM CHLORIDE: 9 INJECTION, SOLUTION INTRAVENOUS at 11:03:00

## 2023-12-19 RX ADMIN — SODIUM CHLORIDE: 9 INJECTION, SOLUTION INTRAVENOUS at 10:43:00

## 2023-12-19 NOTE — OPERATIVE REPORT
[facility] Endoscopy Report  Date of procedure-December 19, 2023    Preoperative Diagnosis:  -History of colon cancer  -Colorectal cancer screening      Postoperative Diagnosis:  -Colon polyp x 1  -Colonic anastomosis at 38 cm  -Diverticular disease   -Internal hemorrhoids      Procedure:    Colonoscopy       Surgeon:  Will Montalvo M.D. Anesthesia:  MAC     Technique:  After informed consent, the patient was placed in the left lateral recumbent position. Digital rectal examination revealed no palpable intraluminal abnormalities. An Olympus variable stiffness 190 series HD colonoscope was inserted into the rectum and advanced under direct vision by following the lumen to the cecum. The colon was examined upon withdrawal in the left lateral position. The procedure was well tolerated without immediate complication. Findings:  The preparation of the colon was good. The terminal ileum was examined for 4 cm and visually normal.  The ileocecal valve was well preserved. The visualized colonic mucosa from the cecum to the anal verge was normal with an intact vascular pattern. A widely patent surgical anastomosis was noted at 38 cm. Sigmoid colon polyp x 1, this was sessile 4 mm in size and cold snare removed. There was no bleeding at the polypectomy site and specimens retrieved and sent for analysis. Dimpling of the colon wall in the sigmoid region consistent with diverticular disease. No diverticulitis. Small internal hemorrhoids noted on retroflexed view. Estimated blood loss-insignificant  Specimens-colon polyp      Impression:  -Colon polyp x 1  -Colonic anastomosis at 38 cm  -Diverticular disease   -Internal hemorrhoids    Recommendations:  - Post polypectomy instructions given  - Repeat colonoscopy in 3 years  - High fiber diet for diverticular disease  - Symptomatic treatment of hemorrhoids          Tiffanie Stahl.  Neha Knox MD  12/19/2023  11:09 AM

## 2023-12-19 NOTE — H&P
History & Physical Examination    Patient Name: Francia Carvalho  MRN: [id number]  CSN: [id number]  YOB: 1970    Diagnosis:   Hx colon cancer   Colon cancer screening      Medications Prior to Admission   Medication Sig Dispense Refill Last Dose    naproxen 500 MG Oral Tab Take 1 tablet (500 mg total) by mouth 2 (two) times daily with meals. 30 tablet 0     cyclobenzaprine 10 MG Oral Tab Take 1 tablet (10 mg total) by mouth nightly. 15 tablet 0 12/16/2023    hydroxychloroquine 200 MG Oral Tab Take 1 tablet (200 mg total) by mouth 2 (two) times daily. 180 tablet 3 12/12/2023    Na Sulfate-K Sulfate-Mg Sulf (SUPREP BOWEL PREP KIT) 17.5-3.13-1.6 GM/177ML Oral Solution Take prep as directed by gastro office. May substitute with Trilyte/generic equivalent if needed. (Patient not taking: Reported on 12/6/2023) 1 each 0     VTAMA 1 % External Cream Apply 1 Application. topically 2 (two) times daily as needed. (Patient not taking: Reported on 8/2/2023)        Current Facility-Administered Medications   Medication Dose Route Frequency    lactated ringers infusion   Intravenous Continuous       Allergies:    Allergies   Allergen Reactions    Sulfa Antibiotics OTHER (SEE COMMENTS)     Vomiting       Past Medical History:   Diagnosis Date    Rheumatoid arthritis ([facility] [address]      Past Surgical History:   Procedure Laterality Date    COLONOSCOPY N/A 9/14/2022    Procedure: COLONOSCOPY;  Surgeon: Ray Pierre MD;  Location: [facility] ENDO    COLONOSCOPY      CYSTOSCOPY,INSERT URETERAL STENT      HERNIA SURGERY       Family History   Problem Relation Age of Onset    Glaucoma Neg     Macular degeneration Neg     Diabetes Neg      Social History     Tobacco Use    Smoking status: Never    Smokeless tobacco: Never   Substance Use Topics    Alcohol use: Yes     Comment: 3 [employer] daily        SYSTEM Check if Review is Normal Check if Physical Exam is Normal If not normal, please explain:   HEENT [x ] [ x]    NECK & BACK [x ] [x ]    HEART [x ] [ x]    LUNGS [x ] [ x]    ABDOMEN [x ] [x ]    UROGENITAL [ ] [ ]    EXTREMITIES [x ] [x ]    OTHER        [ x ] I have discussed the risks and benefits and alternatives with the patient/family. They understand and agree to proceed with plan of care. [ x ] I have reviewed the History and Physical done within the last 30 days. Any changes noted above. Tiffanie Stahl.  Neha Knox MD  12/19/2023  10:35 AM

## 2023-12-19 NOTE — DISCHARGE INSTRUCTIONS

## 2023-12-19 NOTE — ANESTHESIA POSTPROCEDURE EVALUATION
Patient: Chris Dickinsonetti    Procedure Summary       Date: 12/19/23 Room / Location: [facility] ENDOSCOPY 01 / [facility] ENDO    Anesthesia Start: 1042 Anesthesia Stop: 1108    Procedure: COLONOSCOPY Diagnosis:       Adenocarcinoma of sigmoid colon ([address]      (hemorrhoids; polyp; colon anastomosis)    Surgeons: Liliane Boland MD Anesthesiologist: Shanae Pierre MD    Anesthesia Type: MAC ASA Status: 2            Anesthesia Type: MAC    Vitals Value Taken Time   /88 12/19/23 1107   Temp  12/19/23 1108   Pulse 71 12/19/23 1107   Resp 14 12/19/23 1107   SpO2 97 % 12/19/23 1107       [facility] AN Post Evaluation:   Patient Evaluated in Patient location: GI recovery.   Patient Participation: complete - patient participated  Level of Consciousness: sleepy but conscious  Pain Score: 0  Pain Management: adequate  Airway Patency:patent  Dental exam unchanged from preop  Yes    Cardiovascular Status: acceptable and hemodynamically stable  Respiratory Status: acceptable, nasal cannula, nonlabored ventilation and spontaneous ventilation  Postoperative Hydration acceptable      Jak Mahmood MD  12/19/2023 11:08 AM

## 2023-12-22 ENCOUNTER — TELEPHONE (OUTPATIENT)
Facility: CLINIC | Age: 53
End: 2023-12-22

## 2023-12-22 NOTE — TELEPHONE ENCOUNTER
----- Message from Jorge Haque MD sent at 12/21/2023  5:31 PM CST -----  I wanted to get back to you with your colonoscopy results. You had one colon polyp removed which was benign. I would advise a repeat colonoscopy in 3 years to make sure no new polyps are forming.

## 2023-12-22 NOTE — TELEPHONE ENCOUNTER
Health Maintenance Updated. 3 year colonoscopy recall entered into patient outreach in [address] [place] [address] Rd. Next colonoscopy will be due 12/19/2026.

## 2023-12-22 NOTE — TELEPHONE ENCOUNTER
Left message informing patient results in [employer] too and to call us back if questions. CSS:  Please transfer to RN if patient calls back. Thank you.

## 2023-12-29 ENCOUNTER — MED REC SCAN ONLY (OUTPATIENT)
Facility: CLINIC | Age: 53
End: 2023-12-29

## 2025-05-21 NOTE — PATIENT INSTRUCTIONS
Long-term use of Plaquenil   No evidence of plaquenil toxicity in either eye. Will follow in 1 year for a plaquenil eye exam based on current guidelines.    Patient is approved to continue on plaquenil as directed by their PCP or rheumatologist.        Miguel Writer spoke with the patient to try to get additional information on her situation.     She did a UA inpatient but states she did not receive the results.     Patient states he urine is orange, foamy, and has a foul odor.     When asked about additional symptoms patient started yelling into the phone and stating she cannot answer anymore questions.     Patient ended the call.     Will route to provider for recommendations regarding the UA.

## (undated) DEVICE — CANNULA SEAL

## (undated) DEVICE — STAPLER 60: Brand: SUREFORM

## (undated) DEVICE — SUT SILK 2-0 SH K833H

## (undated) DEVICE — BLADELESS OBTURATOR: Brand: WECK VISTA

## (undated) DEVICE — TUBING MEGADYNE LAPAROSCOPIC

## (undated) DEVICE — SUT SILK 2-0 SA85H

## (undated) DEVICE — APPLICATOR CHLORAPREP 26ML

## (undated) DEVICE — SUT VICRYL 2-0 CT-1 J339H

## (undated) DEVICE — SYSTEM SURGYPAD VELCRO 36IN

## (undated) DEVICE — DRAPE SHEET LARGE 76X55

## (undated) DEVICE — SOLUTION  .9 3000ML

## (undated) DEVICE — UNDYED BRAIDED (POLYGLACTIN 910), SYNTHETIC ABSORBABLE SUTURE: Brand: COATED VICRYL

## (undated) DEVICE — COLUMN DRAPE

## (undated) DEVICE — CLIP LGT 11MM OPEN 2.8MM 235CM

## (undated) DEVICE — VESSEL SEALER EXTEND: Brand: ENDOWRIST

## (undated) DEVICE — DRAPE,UNDRBUT,WHT GRAD PCH,CAPPORT,20/CS: Brand: MEDLINE

## (undated) DEVICE — CLOSURE EXOFIN 1.0ML

## (undated) DEVICE — LINE MNTR ADLT SET O2 INTMD

## (undated) DEVICE — KIT CLEAN ENDOKIT 1.1OZ GOWNX2

## (undated) DEVICE — INTENDED TO BE USED TO OCCLUDE, RETRACT AND IDENTIFY ARTERIES, VEINS, TENDONS AND NERVES IN SURGICAL PROCEDURES: Brand: STERION®  VESSEL LOOP

## (undated) DEVICE — A P RESECTION: Brand: MEDLINE INDUSTRIES, INC.

## (undated) DEVICE — SUT ETHILON 3-0 FS-1 669H

## (undated) DEVICE — SUT SILK 3-0 SH K832H

## (undated) DEVICE — 3M™ IOBAN™ 2 ANTIMICROBIAL INCISE DRAPE 6650EZ: Brand: IOBAN™ 2

## (undated) DEVICE — INSUFFLATION NEEDLE TO ESTABLISH PNEUMOPERITONEUM.: Brand: INSUFFLATION NEEDLE

## (undated) DEVICE — TIP COVER ACCESSORY

## (undated) DEVICE — TOWEL SURG OR 17X30IN BLUE

## (undated) DEVICE — SOL  .9 1000ML BAG

## (undated) DEVICE — ARM DRAPE

## (undated) DEVICE — GAMMEX® PI HYBRID SIZE 7, STERILE POWDER-FREE SURGICAL GLOVE, POLYISOPRENE AND NEOPRENE BLEND: Brand: GAMMEX

## (undated) DEVICE — HARMONIC 1100 SHEARS, 36CM SHAFT LENGTH: Brand: HARMONIC

## (undated) DEVICE — SUT VICRYL 0 J906G

## (undated) DEVICE — FENESTRATED BIPOLAR FORCEPS: Brand: ENDOWRIST

## (undated) DEVICE — DISPOSABLE SUCTION/IRRIGATOR TUBE SET: Brand: AHTO

## (undated) DEVICE — REM POLYHESIVE ADULT PATIENT RETURN ELECTRODE: Brand: VALLEYLAB

## (undated) DEVICE — MEGA SUTURECUT ND: Brand: ENDOWRIST

## (undated) DEVICE — SUTURE PASSOR WITH GUIDE

## (undated) DEVICE — Device: Brand: DUAL NARE NASAL CANNULAE FEMALE LUER CON 7FT O2 TUBE

## (undated) DEVICE — KIT ENDO ORCAPOD 160/180/190

## (undated) DEVICE — 60 ML SYRINGE REGULAR TIP: Brand: MONOJECT

## (undated) DEVICE — GOWN SURG AERO BLUE PERF XLG

## (undated) DEVICE — SNARE OPTMZ PLPCTM TRP

## (undated) DEVICE — Device: Brand: DISPOSABLE BULB & BLADDER

## (undated) DEVICE — MEDI-VAC NON-CONDUCTIVE SUCTION TUBING 6MM X 1.8M (6FT.) L: Brand: CARDINAL HEALTH

## (undated) DEVICE — SOLUTION  .9 1000ML BTL

## (undated) DEVICE — LASSO POLYPECTOMY SNARE: Brand: LASSO

## (undated) DEVICE — LAPAROVUE VISIBILITY SYSTEM LAPAROSCOPIC SOLUTIONS: Brand: LAPAROVUE

## (undated) DEVICE — HEXAGONAL CAPTIVATOR STIFF 13M

## (undated) DEVICE — LEGGINGS SRG 48X31IN CUF STRL

## (undated) DEVICE — STAPLER 60 RELOAD BLUE: Brand: SUREFORM

## (undated) DEVICE — LAPAROSCOPIC ACCESS SYSTEM: Brand: ALEXIS LAPAROSCOPIC SYSTEM

## (undated) DEVICE — SIGMOID SURGICAL SUCTION INSTRUMENT: Brand: ARGYLE

## (undated) DEVICE — AIRSEAL 5 MM ACCESS PORT AND LOW PROFILE OBTURATOR WITH BLADELESS OPTICAL TIP, 120 MM LENGTH: Brand: AIRSEAL

## (undated) DEVICE — VIOLET BRAIDED (POLYGLACTIN 910), SYNTHETIC ABSORBABLE SUTURE: Brand: COATED VICRYL

## (undated) DEVICE — PROXIMATE SKIN STAPLERS (35 WIDE) CONTAINS 35 STAINLESS STEEL STAPLES (FIXED HEAD): Brand: PROXIMATE

## (undated) DEVICE — DRAPE SHEET LAPCHOLE 124X100X7

## (undated) DEVICE — TIP-UP FENESTRATED GRASPER: Brand: ENDOWRIST

## (undated) DEVICE — TRAY SURESTEP 16 BARDEX DRAIN

## (undated) DEVICE — BANDAGE,GAUZE,BULKEE II,4.5"X4.1YD,STRL: Brand: MEDLINE

## (undated) DEVICE — TRI-LUMEN FILTERED TUBE SET WITH ACTIVATED CHARCOAL FILTER: Brand: AIRSEAL

## (undated) NOTE — LETTER
[address] [facility] [address] [facility] [place]  Authorization for Surgical Operation and Procedure                                                                                           I hereby authorize Desiree Luna MD, my physician and his/her assistants (if applicable), which may include medical students, residents, and/or fellows, to perform the following surgical operation/ procedure and administer such anesthesia as may be determined necessary by my physician: Operation/Procedure name (s) COLONOSCOPY on [address] [facility]   2. I recognize that during the surgical operation/procedure, unforeseen conditions may necessitate additional or different procedures than those listed above. I, therefore, further authorize and request that the above-named surgeon, assistants, or designees perform such procedures as are, in their judgment, necessary and desirable. 3.   My surgeon/physician has discussed prior to my surgery the potential benefits, risks and side effects of this procedure; the likelihood of achieving goals; and potential problems that might occur during recuperation. They also discussed reasonable alternatives to the procedure, including risks, benefits, and side effects related to the alternatives and risks related to not receiving this procedure. I have had all my questions answered and I acknowledge that no guarantee has been made as to the result that may be obtained. 4.   Should the need arise during my operation/procedure, which includes change of level of care prior to discharge, I also consent to the administration of blood and/or blood products. Further, I understand that despite careful testing and screening of blood or blood products by collecting agencies, I may still be subject to ill effects as a result of receiving a blood transfusion and/or blood products.   The following are some, but not all, of the potential risks that can occur: fever and allergic reactions, hemolytic reactions, transmission of diseases such as Hepatitis, AIDS and Cytomegalovirus (CMV) and fluid overload. In the event that I wish to have an autologous transfusion of my own blood, or a directed donor transfusion, I will discuss this with my physician. Check only if Refusing Blood or Blood Products  I understand refusal of blood or blood products as deemed necessary by my physician may have serious consequences to my condition to include possible death. I hereby assume responsibility for my refusal and release the hospital, its personnel, and my physicians from any responsibility for the consequences of my refusal.    o  Refuse   5. I authorize the use of any specimen, organs, tissues, body parts or foreign objects that may be removed from my body during the operation/procedure for diagnosis, research or teaching purposes and their subsequent disposal by hospital authorities. I also authorize the release of specimen test results and/or written reports to my treating physician on the hospital medical staff or other referring or consulting physicians involved in my care, at the discretion of the Pathologist or my treating physician. 6.   I consent to the photographing or videotaping of the operations or procedures to be performed, including appropriate portions of my body for medical, scientific, or educational purposes, provided my identity is not revealed by the pictures or by descriptive texts accompanying them. If the procedure has been photographed/videotaped, the surgeon will obtain the original picture, image, videotape or CD. The hospital will not be responsible for storage, release or maintenance of the picture, image, tape or CD.    7.   I consent to the presence of a  or observers in the operating room as deemed necessary by my physician or their designees.     8.   I recognize that in the event my procedure results in extended X-Ray/fluoroscopy time, I may develop a skin reaction. 9. If I have a Do Not Attempt Resuscitation (DNAR) order in place, that status will be suspended while in the operating room, procedural suite, and during the recovery period unless otherwise explicitly stated by me (or a person authorized to consent on my behalf). The surgeon or my attending physician will determine when the applicable recovery period ends for purposes of reinstating the DNAR order. 10. Patients having a sterilization procedure: I understand that if the procedure is successful the results will be permanent and it will therefore be impossible for me to inseminate, conceive, or bear children. I also understand that the procedure is intended to result in sterility, although the result has not been guaranteed. 11. I acknowledge that my physician has explained sedation/analgesia administration to me including the risk and benefits I consent to the administration of sedation/analgesia as may be necessary or desirable in the judgment of my physician. I CERTIFY THAT I HAVE READ AND FULLY UNDERSTAND THE ABOVE CONSENT TO OPERATION and/or OTHER PROCEDURE.     _________________________________________ _________________________________     ___________________________________  Signature of Patient     Signature of Responsible Person                   Printed Name of Responsible Person                              _________________________________________ ______________________________        ___________________________________  Signature of Witness         Date  Time         Relationship to Patient    STATEMENT OF PHYSICIAN My signature below affirms that prior to the time of the procedure; I have explained to the patient and/or his/her legal representative, the risks and benefits involved in the proposed treatment and any reasonable alternative to the proposed treatment.  I have also explained the risks and benefits involved in refusal of the proposed treatment and alternatives to the proposed treatment and have answered the patient's questions.  If I have a significant financial interest in a co-management agreement or a significant financial interest in any product or implant, or other significant relationship used in this procedure/surgery, I have disclosed this and had a discussion with my patient.     _______________________________________________________________ _____________________________  Yulia Coke of Physician)                                                                                         (Date)                                   (Time)  Patient Name: Chente Vasquez    : 1970   Printed: 2023      Medical Record #: [id number]                                              Page 1 of 1

## (undated) NOTE — LETTER
[address]  Authorization for Surgical Operation and Procedure                                                                                           1. I hereby  Tonya Alcala MD, my physician and his/her assistants (if applicable), which may include medical students, residents, and/or fellows, to perform the following surgical operation/ procedure and administer such anesthesia as may be determined necessary by my physician: Operation/Procedure name (s) Colonoscopy with possible biopsy and polypectomy, possible tattooing  on [address] [facility]   2. I recognize that during the surgical operation/procedure, unforeseen conditions may necessitate additional or different procedures than those listed above. I, therefore, further authorize and request that the above-named surgeon, assistants, or designees perform such procedures as are, in their judgment, necessary and desirable. 3.   My surgeon/physician has discussed prior to my surgery the potential benefits, risks and side effects of this procedure; the likelihood of achieving goals; and potential problems that might occur during recuperation. They also discussed reasonable alternatives to the procedure, including risks, benefits, and side effects related to the alternatives and risks related to not receiving this procedure. I have had all my questions answered and I acknowledge that no guarantee has been made as to the result that may be obtained. 4.   Should the need arise during my operation/procedure, which includes change of level of care prior to discharge, I also consent to the administration of blood and/or blood products. Further, I understand that despite careful testing and screening of blood or blood products by collecting agencies, I may still be subject to ill effects as a result of receiving a blood transfusion and/or blood products.   The following are some, but not all, of the potential risks that can occur: fever and allergic reactions, hemolytic reactions, transmission of diseases such as Hepatitis, AIDS and Cytomegalovirus (CMV) and fluid overload. In the event that I wish to have an autologous transfusion of my own blood, or a directed donor transfusion, I will discuss this with my physician. Check only if Refusing Blood or Blood Products  I understand refusal of blood or blood products as deemed necessary by my physician may have serious consequences to my condition to include possible death. I hereby assume responsibility for my refusal and release the hospital, its personnel, and my physicians from any responsibility for the consequences of my refusal.           ____ Refuse      5. I authorize the use of any specimen, organs, tissues, body parts or foreign objects that may be removed from my body during the operation/procedure for diagnosis, research or teaching purposes and their subsequent disposal by hospital authorities. I also authorize the release of specimen test results and/or written reports to my treating physician on the hospital medical staff or other referring or consulting physicians involved in my care, at the discretion of the Pathologist or my treating physician. 6.   I consent to the photographing or videotaping of the operations or procedures to be performed, including appropriate portions of my body for medical, scientific, or educational purposes, provided my identity is not revealed by the pictures or by descriptive texts accompanying them. If the procedure has been photographed/videotaped, the surgeon will obtain the original picture, image, videotape or CD. The hospital will not be responsible for storage, release or maintenance of the picture, image, tape or CD.    7.   I consent to the presence of a  or observers in the operating room as deemed necessary by my physician or their designees.     8.   I recognize that in the event my procedure results in extended X-Ray/fluoroscopy time, I may develop a skin reaction. 9. If I have a Do Not Attempt Resuscitation (DNAR) order in place, that status will be suspended while in the operating room, procedural suite, and during the recovery period unless otherwise explicitly stated by me (or a person authorized to consent on my behalf). The surgeon or my attending physician will determine when the applicable recovery period ends for purposes of reinstating the DNAR order. 10. Patients having a sterilization procedure: I understand that if the procedure is successful the results will be permanent and it will therefore be impossible for me to inseminate, conceive, or bear children. I also understand that the procedure is intended to result in sterility, although the result has not been guaranteed. 11. I acknowledge that my physician has explained sedation/analgesia administration to me including the risk and benefits I consent to the administration of sedation/analgesia as may be necessary or desirable in the judgment of my physician. I CERTIFY THAT I HAVE READ AND FULLY UNDERSTAND THE ABOVE CONSENT TO OPERATION and/or OTHER PROCEDURE.     _________________________________________ _________________________________     ___________________________________  Signature of Patient     Signature of Responsible Person                   Printed Name of Responsible Person                              _________________________________________ ______________________________        ___________________________________  Signature of Witness         Date  Time         Relationship to Patient    STATEMENT OF PHYSICIAN My signature below affirms that prior to the time of the procedure; I have explained to the patient and/or his/her legal representative, the risks and benefits involved in the proposed treatment and any reasonable alternative to the proposed treatment.  I have also explained the risks and benefits involved in refusal of the proposed treatment and alternatives to the proposed treatment and have answered the patient's questions.  If I have a significant financial interest in a co-management agreement or a significant financial interest in any product or implant, or other significant relationship used in this procedure/surgery, I have disclosed this and had a discussion with my patient.     _______________________________________________________________ _____________________________  Allyn Manning)                                                                                         (Date)                                   (Time)  Patient Name: Wetzel Spatz    : 1970   Printed: 10/13/2022      Medical Record #: [id number]                                              Page 1 of 1

## (undated) NOTE — LETTER
6/29/2023    Charlotte Moreno        [phone]            Dear Charlotte Moreno,      Our records indicate that you are due for an appointment for a Colonoscopy with Carmelina English MD. Our doctors are booking out about 3-5 months in advance for procedures. Please call our office to schedule a phone screening appointment to plan for the procedure(s). Your medical well-being is important to us. If your insurance requires a referral, please call your primary care office to request one.       Thank you,      The Physicians and Staff at [facility]

## (undated) NOTE — LETTER
[address]  Authorization for Surgical Operation and Procedure                                                                                           1. I hereby Ilda Apple MD, my physician and his/her assistants (if applicable), which may include medical students, residents, and/or fellows, to perform the following surgical operation/ procedure and administer such anesthesia as may be determined necessary by my physician: Operation/Procedure name (s) XI robot-assisted, laparoscopic, possible open, left hemicolectomy   on [address] [facility]   2. I recognize that during the surgical operation/procedure, unforeseen conditions may necessitate additional or different procedures than those listed above. I, therefore, further authorize and request that the above-named surgeon, assistants, or designees perform such procedures as are, in their judgment, necessary and desirable. 3.   My surgeon/physician has discussed prior to my surgery the potential benefits, risks and side effects of this procedure; the likelihood of achieving goals; and potential problems that might occur during recuperation. They also discussed reasonable alternatives to the procedure, including risks, benefits, and side effects related to the alternatives and risks related to not receiving this procedure. I have had all my questions answered and I acknowledge that no guarantee has been made as to the result that may be obtained. 4.   Should the need arise during my operation/procedure, which includes change of level of care prior to discharge, I also consent to the administration of blood and/or blood products. Further, I understand that despite careful testing and screening of blood or blood products by collecting agencies, I may still be subject to ill effects as a result of receiving a blood transfusion and/or blood products.   The following are some, but not all, of the potential risks that can occur: fever and allergic reactions, hemolytic reactions, transmission of diseases such as Hepatitis, AIDS and Cytomegalovirus (CMV) and fluid overload. In the event that I wish to have an autologous transfusion of my own blood, or a directed donor transfusion, I will discuss this with my physician. Check only if Refusing Blood or Blood Products  I understand refusal of blood or blood products as deemed necessary by my physician may have serious consequences to my condition to include possible death. I hereby assume responsibility for my refusal and release the hospital, its personnel, and my physicians from any responsibility for the consequences of my refusal.           ____ Refuse      5. I authorize the use of any specimen, organs, tissues, body parts or foreign objects that may be removed from my body during the operation/procedure for diagnosis, research or teaching purposes and their subsequent disposal by hospital authorities. I also authorize the release of specimen test results and/or written reports to my treating physician on the hospital medical staff or other referring or consulting physicians involved in my care, at the discretion of the Pathologist or my treating physician. 6.   I consent to the photographing or videotaping of the operations or procedures to be performed, including appropriate portions of my body for medical, scientific, or educational purposes, provided my identity is not revealed by the pictures or by descriptive texts accompanying them. If the procedure has been photographed/videotaped, the surgeon will obtain the original picture, image, videotape or CD. The hospital will not be responsible for storage, release or maintenance of the picture, image, tape or CD.    7.   I consent to the presence of a  or observers in the operating room as deemed necessary by my physician or their designees.     8.   I recognize that in the event my procedure results in extended X-Ray/fluoroscopy time, I may develop a skin reaction. 9. If I have a Do Not Attempt Resuscitation (DNAR) order in place, that status will be suspended while in the operating room, procedural suite, and during the recovery period unless otherwise explicitly stated by me (or a person authorized to consent on my behalf). The surgeon or my attending physician will determine when the applicable recovery period ends for purposes of reinstating the DNAR order. 10. Patients having a sterilization procedure: I understand that if the procedure is successful the results will be permanent and it will therefore be impossible for me to inseminate, conceive, or bear children. I also understand that the procedure is intended to result in sterility, although the result has not been guaranteed. 11. I acknowledge that my physician has explained sedation/analgesia administration to me including the risk and benefits I consent to the administration of sedation/analgesia as may be necessary or desirable in the judgment of my physician. I CERTIFY THAT I HAVE READ AND FULLY UNDERSTAND THE ABOVE CONSENT TO OPERATION and/or OTHER PROCEDURE.     _________________________________________ _________________________________     ___________________________________  Signature of Patient     Signature of Responsible Person                   Printed Name of Responsible Person                              _________________________________________ ______________________________        ___________________________________  Signature of Witness         Date  Time         Relationship to Patient    STATEMENT OF PHYSICIAN My signature below affirms that prior to the time of the procedure; I have explained to the patient and/or his/her legal representative, the risks and benefits involved in the proposed treatment and any reasonable alternative to the proposed treatment.  I have also explained the risks and benefits involved in refusal of the proposed treatment and alternatives to the proposed treatment and have answered the patient's questions.  If I have a significant financial interest in a co-management agreement or a significant financial interest in any product or implant, or other significant relationship used in this procedure/surgery, I have disclosed this and had a discussion with my patient.     _______________________________________________________________ _____________________________  Lauren Kim of Physician)                                                                                         (Date)                                   (Time)  Patient Name: Chase Hollingsworth    : 1970   Printed: 10/13/2022      Medical Record #: [id number]                                              Page 1 of 1

## (undated) NOTE — LETTER
February 18, 2020    Alexei Olivera MD  [address]     Patient: Berto Cm   YOB: 1970   Date of Visit: 2/18/2020       Dear Dr. Vicky Jones MD:    Thank you for referring Berto Cm to Genitourinary, Musculoskeletal, HENT, Endocrine, Cardiovascular, Respiratory, Psychiatric, Allergic/Imm, Heme/Lymph    Last edited by Jolene Perez OT on 2/18/2020  8:42 AM. (History)          PHYSICAL EXAM:     Base Eye Exam     Visual Acuity (Snellen - Final Rx #2       Sphere Cylinder Grant Dist [place] Add Near [place]    Right -4.00 +0.75 025 20/20      Left -5.75 +0.75 085 20/20      Type:  Distance only                 ASSESSMENT/PLAN:     Diagnoses and Plan:     Long-term use of Plaquenil   No evidence of pl

## (undated) NOTE — LETTER
10/11/2022         Dear Dr. Chito Granadoing,    This letter is to confirm that Mr. Guilherme Goldberg has been cleared for the surgery scheduled on 10/17/2022. Please do not hesitate to contact me at the clinic should you have any questions or need further assistance.     Sincerely,        Supriya Medina MD   [address] [place] [address]